# Patient Record
Sex: MALE | Race: WHITE
[De-identification: names, ages, dates, MRNs, and addresses within clinical notes are randomized per-mention and may not be internally consistent; named-entity substitution may affect disease eponyms.]

---

## 2017-03-10 ENCOUNTER — HOSPITAL ENCOUNTER (OUTPATIENT)
Dept: HOSPITAL 79 - NM | Age: 52
End: 2017-03-10
Attending: SURGERY
Payer: COMMERCIAL

## 2017-03-10 DIAGNOSIS — R10.11: Primary | ICD-10-CM

## 2017-03-10 PROCEDURE — A9537 TC99M MEBROFENIN: HCPCS

## 2017-03-13 ENCOUNTER — HOSPITAL ENCOUNTER (OUTPATIENT)
Dept: HOSPITAL 79 - KOH-I | Age: 52
End: 2017-03-13
Attending: SURGERY
Payer: COMMERCIAL

## 2017-03-13 DIAGNOSIS — R10.11: Primary | ICD-10-CM

## 2017-03-13 DIAGNOSIS — N20.0: ICD-10-CM

## 2017-05-28 ENCOUNTER — HOSPITAL ENCOUNTER (EMERGENCY)
Dept: HOSPITAL 79 - ER1 | Age: 52
Discharge: HOME | End: 2017-05-28
Payer: COMMERCIAL

## 2017-05-28 DIAGNOSIS — N13.2: Primary | ICD-10-CM

## 2017-05-28 DIAGNOSIS — E11.65: ICD-10-CM

## 2017-05-28 DIAGNOSIS — F17.220: ICD-10-CM

## 2017-05-28 DIAGNOSIS — I10: ICD-10-CM

## 2017-05-28 LAB
BUN/CREATININE RATIO: 19 (ref 0–10)
HGB BLD-MCNC: 14.2 GM/DL (ref 14–17.5)
RED BLOOD COUNT: 4.65 M/UL (ref 4.2–5.5)
WHITE BLOOD COUNT: 10.1 K/UL (ref 4.5–11)

## 2020-12-25 ENCOUNTER — HOSPITAL ENCOUNTER (INPATIENT)
Dept: HOSPITAL 79 - ER1 | Age: 55
LOS: 4 days | Discharge: HOME | DRG: 872 | End: 2020-12-29
Attending: INTERNAL MEDICINE | Admitting: INTERNAL MEDICINE
Payer: COMMERCIAL

## 2020-12-25 DIAGNOSIS — N40.0: ICD-10-CM

## 2020-12-25 DIAGNOSIS — Z20.828: ICD-10-CM

## 2020-12-25 DIAGNOSIS — F17.210: ICD-10-CM

## 2020-12-25 DIAGNOSIS — E11.65: ICD-10-CM
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
## 2020-12-25 DIAGNOSIS — Z95.820: ICD-10-CM

## 2020-12-25 DIAGNOSIS — G62.9: ICD-10-CM

## 2020-12-25 DIAGNOSIS — I10: ICD-10-CM

## 2020-12-25 DIAGNOSIS — Z82.49: ICD-10-CM

## 2020-12-25 DIAGNOSIS — I25.2: ICD-10-CM

## 2020-12-25 DIAGNOSIS — E78.5: ICD-10-CM

## 2020-12-25 DIAGNOSIS — A41.02: Primary | ICD-10-CM

## 2020-12-25 DIAGNOSIS — Z87.442: ICD-10-CM

## 2020-12-25 DIAGNOSIS — L03.113: ICD-10-CM

## 2020-12-25 DIAGNOSIS — B95.7: ICD-10-CM

## 2020-12-25 DIAGNOSIS — K21.9: ICD-10-CM

## 2020-12-25 DIAGNOSIS — Z83.3: ICD-10-CM

## 2020-12-25 LAB
BUN/CREATININE RATIO: 22 (ref 0–10)
HGB BLD-MCNC: 15.1 GM/DL (ref 14–17.5)
RED BLOOD COUNT: 4.89 M/UL (ref 4.2–5.5)
WHITE BLOOD COUNT: 12.1 K/UL (ref 4.5–11)

## 2020-12-25 PROCEDURE — G0378 HOSPITAL OBSERVATION PER HR: HCPCS

## 2020-12-25 PROCEDURE — U0003 INFECTIOUS AGENT DETECTION BY NUCLEIC ACID (DNA OR RNA); SEVERE ACUTE RESPIRATORY SYNDROME CORONAVIRUS 2 (SARS-COV-2) (CORONAVIRUS DISEASE [COVID-19]), AMPLIFIED PROBE TECHNIQUE, MAKING USE OF HIGH THROUGHPUT TECHNOLOGIES AS DESCRIBED BY CMS-2020-01-R: HCPCS

## 2020-12-26 LAB
BUN/CREATININE RATIO: 24 (ref 0–10)
HGB BLD-MCNC: 13.6 GM/DL (ref 14–17.5)
RED BLOOD COUNT: 4.5 M/UL (ref 4.2–5.5)
WHITE BLOOD COUNT: 10.6 K/UL (ref 4.5–11)

## 2020-12-27 LAB
BUN/CREATININE RATIO: 20 (ref 0–10)
HGB BLD-MCNC: 12.9 GM/DL (ref 14–17.5)
RED BLOOD COUNT: 4.25 M/UL (ref 4.2–5.5)
WHITE BLOOD COUNT: 8.4 K/UL (ref 4.5–11)

## 2020-12-27 NOTE — NUR
PT STATES HES FEELING SWEATY CHECKED HIUS SUGAR AND WAS 61 GOT HIM REGULAR
SODA AND CANDY BAR TO EAT , INSTRUCTED NOT TO GET OOB WITHOUT ASSISTANCE, PT
VERBALIZES

## 2020-12-28 LAB
BUN/CREATININE RATIO: 15 (ref 0–10)
HGB BLD-MCNC: 12.1 GM/DL (ref 14–17.5)
RED BLOOD COUNT: 4.13 M/UL (ref 4.2–5.5)
WHITE BLOOD COUNT: 8.3 K/UL (ref 4.5–11)

## 2020-12-29 LAB
BUN/CREATININE RATIO: 14 (ref 0–10)
HGB BLD-MCNC: 13.5 GM/DL (ref 14–17.5)
RED BLOOD COUNT: 4.46 M/UL (ref 4.2–5.5)
WHITE BLOOD COUNT: 8.2 K/UL (ref 4.5–11)

## 2020-12-30 ENCOUNTER — HOSPITAL ENCOUNTER (OUTPATIENT)
Dept: HOSPITAL 79 - OPSV | Age: 55
End: 2020-12-30
Attending: INTERNAL MEDICINE
Payer: COMMERCIAL

## 2020-12-30 DIAGNOSIS — L03.119: Primary | ICD-10-CM

## 2021-03-02 ENCOUNTER — HOSPITAL ENCOUNTER (OUTPATIENT)
Dept: HOSPITAL 79 - LAB | Age: 56
End: 2021-03-02
Attending: INTERNAL MEDICINE
Payer: COMMERCIAL

## 2021-03-02 DIAGNOSIS — E78.5: ICD-10-CM

## 2021-03-02 DIAGNOSIS — Z79.4: ICD-10-CM

## 2021-03-02 DIAGNOSIS — E53.8: ICD-10-CM

## 2021-03-02 DIAGNOSIS — E11.21: Primary | ICD-10-CM

## 2021-03-02 LAB — BUN/CREATININE RATIO: 17 (ref 0–10)

## 2021-03-03 LAB
CREATININE, URINE: 118.2 MG/DL
MICROALB/CREAT RATIO: 103 (ref 0–29)

## 2021-03-08 ENCOUNTER — HOSPITAL ENCOUNTER (OUTPATIENT)
Dept: HOSPITAL 79 - LBRF | Age: 56
End: 2021-03-08
Attending: UROLOGY
Payer: COMMERCIAL

## 2021-03-08 DIAGNOSIS — R31.9: Primary | ICD-10-CM

## 2021-07-27 ENCOUNTER — HOSPITAL ENCOUNTER (OUTPATIENT)
Dept: HOSPITAL 79 - LAB | Age: 56
End: 2021-07-27
Attending: FAMILY MEDICINE
Payer: COMMERCIAL

## 2021-07-27 DIAGNOSIS — Z12.5: Primary | ICD-10-CM

## 2021-07-27 DIAGNOSIS — N40.0: ICD-10-CM

## 2021-07-27 DIAGNOSIS — R79.89: ICD-10-CM

## 2021-07-27 DIAGNOSIS — E53.8: ICD-10-CM

## 2021-07-27 DIAGNOSIS — E78.2: ICD-10-CM

## 2021-07-27 DIAGNOSIS — E11.65: ICD-10-CM

## 2021-07-27 LAB
BUN/CREATININE RATIO: 17 (ref 0–10)
HGB BLD-MCNC: 14.8 GM/DL (ref 14–17.5)
RED BLOOD COUNT: 4.77 M/UL (ref 4.2–5.5)
WHITE BLOOD COUNT: 7.9 K/UL (ref 4.5–11)

## 2021-07-28 LAB
CREATININE, URINE: 55.7 MG/DL
MICROALB/CREAT RATIO: 182 (ref 0–29)

## 2021-08-10 ENCOUNTER — HOSPITAL ENCOUNTER (OUTPATIENT)
Dept: HOSPITAL 79 - KOH-I | Age: 56
End: 2021-08-10
Attending: FAMILY MEDICINE
Payer: COMMERCIAL

## 2021-08-10 DIAGNOSIS — F17.210: Primary | ICD-10-CM

## 2021-09-09 ENCOUNTER — TRANSCRIBE ORDERS (OUTPATIENT)
Dept: ADMINISTRATIVE | Facility: HOSPITAL | Age: 56
End: 2021-09-09

## 2021-09-09 DIAGNOSIS — Z11.52 ENCOUNTER FOR SCREENING FOR COVID-19: Primary | ICD-10-CM

## 2021-09-21 ENCOUNTER — HOSPITAL ENCOUNTER (OUTPATIENT)
Dept: HOSPITAL 79 - LAB | Age: 56
End: 2021-09-21
Attending: INTERNAL MEDICINE
Payer: COMMERCIAL

## 2021-09-21 DIAGNOSIS — E11.21: Primary | ICD-10-CM

## 2021-09-21 DIAGNOSIS — E78.5: ICD-10-CM

## 2021-09-21 DIAGNOSIS — Z79.4: ICD-10-CM

## 2021-09-21 LAB — BUN/CREATININE RATIO: 17 (ref 0–10)

## 2021-11-01 ENCOUNTER — HOSPITAL ENCOUNTER (OUTPATIENT)
Dept: HOSPITAL 79 - LAB | Age: 56
End: 2021-11-01
Attending: INTERNAL MEDICINE
Payer: COMMERCIAL

## 2021-11-01 DIAGNOSIS — Z79.4: ICD-10-CM

## 2021-11-01 DIAGNOSIS — E78.5: ICD-10-CM

## 2021-11-01 DIAGNOSIS — E11.21: Primary | ICD-10-CM

## 2021-11-01 LAB — BUN/CREATININE RATIO: 17 (ref 0–10)

## 2023-08-09 ENCOUNTER — TRANSCRIBE ORDERS (OUTPATIENT)
Dept: ADMINISTRATIVE | Facility: HOSPITAL | Age: 58
End: 2023-08-09
Payer: COMMERCIAL

## 2023-08-09 DIAGNOSIS — N28.1 ACQUIRED CYST OF KIDNEY: Primary | ICD-10-CM

## 2023-09-12 ENCOUNTER — HOSPITAL ENCOUNTER (OUTPATIENT)
Dept: ULTRASOUND IMAGING | Facility: HOSPITAL | Age: 58
Discharge: HOME | End: 2023-09-12
Payer: COMMERCIAL

## 2023-09-12 ENCOUNTER — LAB (OUTPATIENT)
Dept: LAB | Facility: HOSPITAL | Age: 58
End: 2023-09-12
Payer: COMMERCIAL

## 2023-09-12 ENCOUNTER — TRANSCRIBE ORDERS (OUTPATIENT)
Dept: ADMINISTRATIVE | Facility: HOSPITAL | Age: 58
End: 2023-09-12
Payer: COMMERCIAL

## 2023-09-12 DIAGNOSIS — N18.31 CHRONIC KIDNEY DISEASE, STAGE 3A: Primary | ICD-10-CM

## 2023-09-12 DIAGNOSIS — N28.1 ACQUIRED CYST OF KIDNEY: ICD-10-CM

## 2023-09-12 DIAGNOSIS — N18.31 CHRONIC KIDNEY DISEASE, STAGE 3A: ICD-10-CM

## 2023-09-12 PROCEDURE — 86803 HEPATITIS C AB TEST: CPT

## 2023-09-12 PROCEDURE — 80053 COMPREHEN METABOLIC PANEL: CPT

## 2023-09-12 PROCEDURE — 86038 ANTINUCLEAR ANTIBODIES: CPT

## 2023-09-12 PROCEDURE — 82043 UR ALBUMIN QUANTITATIVE: CPT

## 2023-09-12 PROCEDURE — 82570 ASSAY OF URINE CREATININE: CPT

## 2023-09-12 PROCEDURE — 86160 COMPLEMENT ANTIGEN: CPT

## 2023-09-12 PROCEDURE — 83516 IMMUNOASSAY NONANTIBODY: CPT

## 2023-09-12 PROCEDURE — 86037 ANCA TITER EACH ANTIBODY: CPT

## 2023-09-12 PROCEDURE — 86225 DNA ANTIBODY NATIVE: CPT

## 2023-09-12 PROCEDURE — 86162 COMPLEMENT TOTAL (CH50): CPT

## 2023-09-12 PROCEDURE — 86334 IMMUNOFIX E-PHORESIS SERUM: CPT

## 2023-09-12 PROCEDURE — 84165 PROTEIN E-PHORESIS SERUM: CPT

## 2023-09-12 PROCEDURE — 82784 ASSAY IGA/IGD/IGG/IGM EACH: CPT

## 2023-09-12 PROCEDURE — 76775 US EXAM ABDO BACK WALL LIM: CPT

## 2023-09-12 PROCEDURE — 36415 COLL VENOUS BLD VENIPUNCTURE: CPT

## 2023-09-12 PROCEDURE — 87340 HEPATITIS B SURFACE AG IA: CPT

## 2023-09-12 PROCEDURE — 84156 ASSAY OF PROTEIN URINE: CPT

## 2023-09-13 ENCOUNTER — LAB (OUTPATIENT)
Dept: LAB | Facility: HOSPITAL | Age: 58
End: 2023-09-13
Payer: COMMERCIAL

## 2023-09-13 DIAGNOSIS — N18.31 CHRONIC KIDNEY DISEASE, STAGE 3A: ICD-10-CM

## 2023-09-13 LAB
ALBUMIN SERPL ELPH-MCNC: 3 G/DL (ref 2.9–4.4)
ALBUMIN SERPL-MCNC: 3.9 G/DL (ref 3.5–5.2)
ALBUMIN UR-MCNC: 113.9 MG/DL
ALBUMIN/GLOB SERPL: 0.9 {RATIO} (ref 0.7–1.7)
ALBUMIN/GLOB SERPL: 1.1 G/DL
ALP SERPL-CCNC: 62 U/L (ref 39–117)
ALPHA1 GLOB SERPL ELPH-MCNC: 0.2 G/DL (ref 0–0.4)
ALPHA2 GLOB SERPL ELPH-MCNC: 1.1 G/DL (ref 0.4–1)
ALT SERPL W P-5'-P-CCNC: 12 U/L (ref 1–41)
ANA SER QL: NEGATIVE
ANION GAP SERPL CALCULATED.3IONS-SCNC: 12.2 MMOL/L (ref 5–15)
AST SERPL-CCNC: 16 U/L (ref 1–40)
B-GLOBULIN SERPL ELPH-MCNC: 1.1 G/DL (ref 0.7–1.3)
BILIRUB SERPL-MCNC: 0.5 MG/DL (ref 0–1.2)
BUN SERPL-MCNC: 16 MG/DL (ref 8–23)
BUN/CREAT SERPL: 13.9 (ref 7–25)
C3 SERPL-MCNC: 157 MG/DL (ref 82–167)
C4 SERPL-MCNC: 40 MG/DL (ref 14–44)
CALCIUM SPEC-SCNC: 9.9 MG/DL (ref 8.6–10.5)
CHLORIDE SERPL-SCNC: 98 MMOL/L (ref 98–107)
CO2 SERPL-SCNC: 26.8 MMOL/L (ref 22–29)
CREAT SERPL-MCNC: 1.15 MG/DL (ref 0.76–1.27)
CREAT UR-MCNC: 46.7 MG/DL
CREAT UR-MCNC: 46.7 MG/DL
DSDNA AB SER-ACNC: 1 IU/ML (ref 0–9)
EGFRCR SERPLBLD CKD-EPI 2021: 72.9 ML/MIN/1.73
GAMMA GLOB SERPL ELPH-MCNC: 1 G/DL (ref 0.4–1.8)
GLOBULIN SER-MCNC: 3.4 G/DL (ref 2.2–3.9)
GLOBULIN UR ELPH-MCNC: 3.4 GM/DL
GLUCOSE SERPL-MCNC: 314 MG/DL (ref 65–99)
HBV SURFACE AG SERPL QL IA: NORMAL
HCV AB SER DONR QL: NORMAL
IGA SERPL-MCNC: 261 MG/DL (ref 90–386)
IGG SERPL-MCNC: 1021 MG/DL (ref 603–1613)
IGM SERPL-MCNC: 121 MG/DL (ref 20–172)
INTERPRETATION SERPL IEP-IMP: ABNORMAL
LABORATORY COMMENT REPORT: ABNORMAL
M PROTEIN SERPL ELPH-MCNC: ABNORMAL G/DL
MICROALBUMIN/CREAT UR: 2439 MG/G
POTASSIUM SERPL-SCNC: 4.2 MMOL/L (ref 3.5–5.2)
PROT ?TM UR-MCNC: 163.9 MG/DL
PROT SERPL-MCNC: 6.4 G/DL (ref 6–8.5)
PROT SERPL-MCNC: 7.3 G/DL (ref 6–8.5)
PROT/CREAT UR: 3509.6 MG/G CREA (ref 0–200)
SODIUM SERPL-SCNC: 137 MMOL/L (ref 136–145)

## 2023-09-13 PROCEDURE — 81050 URINALYSIS VOLUME MEASURE: CPT

## 2023-09-13 PROCEDURE — 84156 ASSAY OF PROTEIN URINE: CPT

## 2023-09-14 LAB
C-ANCA TITR SER IF: NORMAL TITER
CH50 SERPL-ACNC: >60 U/ML
COLLECT DURATION TIME UR: 24 HRS
GBM AB SER IA-ACNC: <0.2 UNITS (ref 0–0.9)
MYELOPEROXIDASE AB SER IA-ACNC: <0.2 UNITS (ref 0–0.9)
P-ANCA ATYPICAL TITR SER IF: NORMAL TITER
P-ANCA TITR SER IF: NORMAL TITER
PROT 24H UR-MRATE: 3143.7 MG/24HOURS (ref 0–150)
PROTEINASE3 AB SER IA-ACNC: <0.2 UNITS (ref 0–0.9)
SPECIMEN VOL 24H UR: 2100 ML

## 2023-12-22 ENCOUNTER — SPECIALTY PHARMACY (OUTPATIENT)
Dept: PHARMACY | Facility: HOSPITAL | Age: 58
End: 2023-12-22
Payer: COMMERCIAL

## 2023-12-22 ENCOUNTER — OFFICE VISIT (OUTPATIENT)
Dept: ENDOCRINOLOGY | Facility: CLINIC | Age: 58
End: 2023-12-22
Payer: COMMERCIAL

## 2023-12-22 VITALS
BODY MASS INDEX: 30.21 KG/M2 | HEART RATE: 91 BPM | HEIGHT: 69 IN | WEIGHT: 204 LBS | SYSTOLIC BLOOD PRESSURE: 124 MMHG | DIASTOLIC BLOOD PRESSURE: 72 MMHG | OXYGEN SATURATION: 99 %

## 2023-12-22 DIAGNOSIS — E11.65 TYPE 2 DIABETES MELLITUS WITH HYPERGLYCEMIA, UNSPECIFIED WHETHER LONG TERM INSULIN USE: Primary | ICD-10-CM

## 2023-12-22 LAB — GLUCOSE BLDC GLUCOMTR-MCNC: 444 MG/DL (ref 70–130)

## 2023-12-22 RX ORDER — ASPIRIN 81 MG/1
81 TABLET ORAL DAILY
COMMUNITY

## 2023-12-22 RX ORDER — CILOSTAZOL 50 MG/1
50 TABLET ORAL 2 TIMES DAILY
COMMUNITY
Start: 2023-09-07

## 2023-12-22 RX ORDER — INSULIN LISPRO 100 [IU]/ML
50 INJECTION, SOLUTION INTRAVENOUS; SUBCUTANEOUS
COMMUNITY
Start: 2023-10-27 | End: 2023-12-22 | Stop reason: SDUPTHER

## 2023-12-22 RX ORDER — PROCHLORPERAZINE 25 MG/1
1 SUPPOSITORY RECTAL
Qty: 1 EACH | Refills: 3 | Status: SHIPPED | OUTPATIENT
Start: 2023-12-22

## 2023-12-22 RX ORDER — OMEPRAZOLE 20 MG/1
20 CAPSULE, DELAYED RELEASE ORAL DAILY
COMMUNITY
Start: 2023-09-13

## 2023-12-22 RX ORDER — PROCHLORPERAZINE 25 MG/1
1 SUPPOSITORY RECTAL CONTINUOUS
Qty: 1 EACH | Refills: 0 | Status: SHIPPED | OUTPATIENT
Start: 2023-12-22

## 2023-12-22 RX ORDER — ICOSAPENT ETHYL 1000 MG/1
2 CAPSULE ORAL 2 TIMES DAILY WITH MEALS
COMMUNITY
Start: 2023-04-24

## 2023-12-22 RX ORDER — FLUTICASONE PROPIONATE 50 MCG
2 SPRAY, SUSPENSION (ML) NASAL DAILY
COMMUNITY

## 2023-12-22 RX ORDER — NEOMYCIN SULFATE, POLYMYXIN B SULFATE AND HYDROCORTISONE 10; 3.5; 1 MG/ML; MG/ML; [USP'U]/ML
4 SUSPENSION/ DROPS AURICULAR (OTIC) 4 TIMES DAILY
COMMUNITY
Start: 2023-09-13 | End: 2023-12-22

## 2023-12-22 RX ORDER — BLOOD SUGAR DIAGNOSTIC
STRIP MISCELLANEOUS
COMMUNITY
Start: 2023-09-19

## 2023-12-22 RX ORDER — ATORVASTATIN CALCIUM 20 MG/1
20 TABLET, FILM COATED ORAL DAILY
COMMUNITY

## 2023-12-22 RX ORDER — ALFUZOSIN HYDROCHLORIDE 10 MG/1
10 TABLET, EXTENDED RELEASE ORAL DAILY
COMMUNITY
Start: 2023-08-09 | End: 2024-08-08

## 2023-12-22 RX ORDER — LANOLIN ALCOHOL/MO/W.PET/CERES
1000 CREAM (GRAM) TOPICAL DAILY
COMMUNITY

## 2023-12-22 RX ORDER — INSULIN LISPRO 100 [IU]/ML
50 INJECTION, SOLUTION INTRAVENOUS; SUBCUTANEOUS
Qty: 45 ML | Refills: 2 | Status: SHIPPED | OUTPATIENT
Start: 2023-12-22

## 2023-12-22 RX ORDER — PROCHLORPERAZINE 25 MG/1
SUPPOSITORY RECTAL
Qty: 3 EACH | Refills: 11 | Status: SHIPPED | OUTPATIENT
Start: 2023-12-22

## 2023-12-22 RX ORDER — SULFAMETHOXAZOLE AND TRIMETHOPRIM 800; 160 MG/1; MG/1
1 TABLET ORAL EVERY 12 HOURS SCHEDULED
COMMUNITY
Start: 2023-10-20 | End: 2023-12-22

## 2023-12-22 RX ORDER — INSULIN GLARGINE 100 [IU]/ML
INJECTION, SOLUTION SUBCUTANEOUS
COMMUNITY
Start: 2023-11-27 | End: 2023-12-22

## 2023-12-22 RX ORDER — GABAPENTIN 800 MG/1
TABLET ORAL
COMMUNITY
Start: 2023-07-28

## 2023-12-22 RX ORDER — LISINOPRIL 10 MG/1
10 TABLET ORAL DAILY
COMMUNITY
Start: 2023-09-13

## 2023-12-22 RX ORDER — PROMETHAZINE HYDROCHLORIDE 25 MG/1
25 TABLET ORAL AS NEEDED
COMMUNITY
Start: 2023-07-11

## 2023-12-22 NOTE — PROGRESS NOTES
Specialty Pharmacy Patient Management Program  Endocrinology Initial Assessment       Tyler Rick is a 58 y.o. male with Type 2 Diabetes seen by an Endocrinology provider and enrolled in the Endocrinology Patient Management program offered by [facility].  An initial outreach was conducted, including assessment of therapy appropriateness and specialty medication education for insulin therapy. The patient was introduced to services offered by [facility], including: regular assessments, refill coordination, curbside pick-up or mail order delivery options, prior authorization maintenance, and financial assistance programs as applicable. The patient was also provided with contact information for the pharmacy team.     Patient was diagnosed with diabetes in the early 2000s. He is currently taking Humalog 50 units three times daily with meals. Patient checks BG 3 times daily with readings between 200-500s. Patient denies low BG <70. He was prescribed Lantus in the past but has not taken in 2-3 months due to being out of refills.     Patient denies personal/family history of thyroid cancer, denies history of pancreatitis, and denies issues with recurrent UTI/yeast infections.     In the past, the patient has tried:     Drug Dose Reason for Discontinuation Notes   Metformin  GI intolerance                  Insurance Coverage & Financial Support  [place] Medicaid     Relevant Past Medical History and Comorbidities  Relevant medical history and concomitant health conditions were discussed with the patient. The patient's chart has been reviewed for relevant past medical history and comorbid health conditions and updated as necessary.   No past medical history on file.  Social History     Socioeconomic History    Marital status: Unknown   Tobacco Use    Smoking status: Every Day     Types: Cigarettes     Passive exposure: Current    Smokeless tobacco: Current     Types: Snuff     "Tobacco comments:     Pt stated he just smokes 2 cigarettes a day with his coffee.    Substance and Sexual Activity    Alcohol use: Never    Drug use: Never    Sexual activity: Defer       Problem list reviewed by Margoth Weinberg RPH on 12/22/2023 at  8:26 AM    Allergies  Known allergies and reactions were discussed with the patient. The patient's chart has been reviewed for  allergy information and updated as necessary.   No Known Allergies    Allergies reviewed by Margoth Weinberg RPH on 12/22/2023 at  8:23 AM    Relevant Laboratory Values    No results found for: \"HGBA1C\"  Lab Results   Component Value Date    GLUCOSE 314 (H) 09/12/2023    CALCIUM 9.9 09/12/2023     09/12/2023    K 4.2 09/12/2023    CO2 26.8 09/12/2023    CL 98 09/12/2023    BUN 16 09/12/2023    CREATININE 1.15 09/12/2023    BCR 13.9 09/12/2023    ANIONGAP 12.2 09/12/2023     No results found for: \"CHOL\", \"CHLPL\", \"TRIG\", \"HDL\", \"LDL\", \"LDLDIRECT\"  Microalbumin          9/12/2023    13:06   Microalbumin   Microalbumin, Urine 113.9        Current Medication List  This medication list has been reviewed with the patient and evaluated for any interactions or necessary modifications/recommendations, and updated to include all prescription medications, OTC medications, and supplements the patient is currently taking.  This list reflects what is contained in the patient's profile, which has also been marked as reviewed to communicate to other providers it is the most up to date version of the patient's current medication therapy.     Current Outpatient Medications:     alfuzosin (UROXATRAL) 10 MG 24 hr tablet, Take 1 tablet by mouth Daily., Disp: , Rfl:     aspirin 81 MG EC tablet, Take 1 tablet by mouth Daily., Disp: , Rfl:     atorvastatin (LIPITOR) 20 MG tablet, Take 1 tablet by mouth Daily., Disp: , Rfl:     cilostazol (PLETAL) 50 MG tablet, Take 1 tablet by mouth 2 (Two) Times a Day., Disp: , Rfl:     Diclofenac Sodium (VOLTAREN) 1 % gel gel, " Apply 2 g topically to the appropriate area as directed 2 (Two) Times a Day As Needed., Disp: , Rfl:     fluticasone (Flonase Allergy Relief) 50 MCG/ACT nasal spray, 2 sprays into the nostril(s) as directed by provider Daily., Disp: , Rfl:     gabapentin (NEURONTIN) 800 MG tablet, Take 1 tablet in morning, take 1 tablet mid day, and 2 tablets at bedtime., Disp: , Rfl:     icosapent ethyl (VASCEPA) 1 g capsule capsule, Take 2 g by mouth 2 (Two) Times a Day With Meals., Disp: , Rfl:     lisinopril (PRINIVIL,ZESTRIL) 10 MG tablet, Take 1 tablet by mouth Daily., Disp: , Rfl:     metoprolol tartrate (LOPRESSOR) 25 MG tablet, Take 1 tablet by mouth 2 (Two) Times a Day., Disp: , Rfl:     omeprazole (priLOSEC) 20 MG capsule, Take 1 capsule by mouth Daily., Disp: , Rfl:     OneTouch Verio test strip, CHECK BLOOD SUGAR THREE TIMES A DAY, Disp: , Rfl:     promethazine (PHENERGAN) 25 MG tablet, Take 1 tablet by mouth As Needed., Disp: , Rfl:     vitamin B-12 (CYANOCOBALAMIN) 1000 MCG tablet, Take 1 tablet by mouth Daily., Disp: , Rfl:     Continuous Blood Gluc  (Dexcom G6 ) device, Use 1 each Continuous., Disp: 1 each, Rfl: 0    Continuous Blood Gluc Sensor (Dexcom G6 Sensor), Use Every 10 (Ten) Days., Disp: 3 each, Rfl: 11    Continuous Blood Gluc Transmit (Dexcom G6 Transmitter) misc, Use 1 each Every 3 (Three) Months., Disp: 1 each, Rfl: 3    Insulin Glargine (LANTUS SOLOSTAR) 100 UNIT/ML injection pen, Inject 40 Units under the skin into the appropriate area as directed Every Night., Disp: 15 mL, Rfl: 2    Insulin Lispro, 1 Unit Dial, (HUMALOG) 100 UNIT/ML solution pen-injector, Inject 50 Units under the skin into the appropriate area as directed 3 (Three) Times a Day Before Meals., Disp: 45 mL, Rfl: 2    Insulin Pen Needle 32G X 6 MM misc, Use 1 each 4 (Four) Times a Day., Disp: 150 each, Rfl: 2    Medicines reviewed by Margoth Weinberg, [facility] on 12/22/2023 at  8:26 AM    Drug Interactions  No significant  drug-drug interactions with diabetes medications expected according to literature.  The hypoglycemic effect of Insulin may be increased or prolonged by metoprolol     Recommended Medications Assessment  Aspirin -  Currently Taking   Statin -  Currently Taking   ACEi/ARB - Currently Taking     Current 10-Year ASCVD Risk: pt has clinical ASCVD    Adherence and Self-Administration  Adherence related to the patient's specialty therapy was discussed with the patient. The Adherence segment of this outreach has been reviewed and updated.   Is there a concern with patient's ability to self administer the medication correctly and without issue?: No  Were any potential barriers to adherence identified during the initial assessment or patient education?: No  Are there any concerns regarding the patient's understanding of the importance of medication adherence?: No    Barriers to Patient Adherence and/or Self-Administration: None   Methods for Supporting Patient Adherence and/or Self-Administration: None required    Goals of Therapy  Goals related to the patient's specialty therapy were discussed with the patient. The Patient Goals segment of this outreach has been reviewed and updated.    Goals Addressed Today        Consistently take medications as prescribed      HEMOGLOBIN A1C < 7           Reassessment Plan & Follow-Up  Patient's diabetes is uncontrolled with most recent A1C of 9.3%.  Medication Therapy Changes:   Continue Humalog 50 units TID with meals   Start Lantus 40 units QHS  Start Dexcom G6. Provided education using demonstration device.    Related Plans, Therapy Recommendations or Therapy Problems to Be Addressed: Provider is ordering labs to test for T1DM vs. T2DM today.     Patient does not wish to use [facility] at this time due to: loyalty to retail pharmacy     Education Provided for DM medications:  The patient has been provided with the following education and any applicable administration  techniques (i.e. self-injection) have been demonstrated for the therapies indicated. All questions and concerns have been addressed prior to the patient receiving the medication, and the patient has verbalized understanding of the education and any materials provided.  Additional patient education shall be provided and documented upon request by the patient, provider or payer.      Dexcom G6 Patient Education  Educated patient on using Dexcom device to monitor BG:   Patient is using  to monitor blood sugars.    Sensors:  Educated on application process: clean area with alcohol beforehand. Place the sensor patch in the applicator and remove the plastic covering the adhesive. Break the small orange piece off the button. Place the applicator against the skin where you'd like to place the sensor and push the orange button to inject the needle into the skin. Discard the applicator. Slide the transmitter onto the sensor, narrow part first and click it into place.  Must be changed every 10 days. One month supply will include three sensors in one box.  Sensor is placed on the abdomen, back of upper arm, or the upper buttocks (ages 2-17 years). Sensor placement is important and you will want to change your insertion site with each sensor.    Using the same site too often might not allow the skin to heal, causing scarring or skin irritation.  Choose a site:  At least 3 inches from any insulin pump infusion set or injection site  Away from waistbands, scarring, tattoos, irritation, and bones  Unlikely to be bumped, pushed, or laid on while sleeping    Transmitter:  Do not discard the transmitter with each sensor change. The sensor is good for 90 days.  When switching sensor, remove the transmitter from the sensor patch, discard the sensor, reapply a new sensor and place transmitter into sensor.      [place] on Smart Phone:  Will be used to alert you when blood sugar is low or high and to obtain blood sugar readings.  High BG alerts are set at 250 and low BG alerts are set at 70.   Will take 2 hours to warm up with each new sensor.   Each time a new sensor is started, type in the unique code for the new sensor.  Each time a new transmitter is started, type in the unique code for the new transmitter.      Attestation  I attest the patient was actively involved in and has agreed to the above plan of care. If the prescribed therapy is at any point deemed not appropriate based on the current or future assessments, a consultation will be initiated with the patient's specialty care provider to determine the best course of action. The revised plan of therapy will be documented along with any required assessments and/or additional patient education provided..    Margoth Weinberg, PharmD, [facility]  Clinical Specialty Pharmacist, Endocrinology  12/22/2023  09:51 EST

## 2023-12-22 NOTE — ASSESSMENT & PLAN NOTE
-Diabetes is above goal with A1c 9.3.  -Discussed dietary and exercise guidelines with patient.  -Discussed the importance of yearly eye exams.  -Discussed the importance of checking BG's regularly.  He would like to start using a CGM.  Will send in RX to pharmacy.  -Continue Humalog 50 units TID.  -Start Lantus 40 units QHS.  -S/S hypoglycemia reviewed with Rule of 15's advised.  -Follow-up in 1 month.

## 2023-12-22 NOTE — PROGRESS NOTES
Chief Complaint   Patient presents with    Diabetes        Referring Provider  Bakari Montanez MD     HPI   Tyler Rick is a 58 y.o. male had concerns including Diabetes.    Seen as a new patient.  T2DM.    Diabetes was diagnosed early 2000's.  Complications include neuropathy, 3 heart attacks.  Last ophtho exam was 2023-[facility].  Current medications for diabetes include Humalog 50 units TID.  Previous meds: Metformin-stopped taking it, Lantus-has not had it in 3 months due to not having his medication  He checks his blood sugar 3 times per day.   Hypos: none    ACE/ARB: yes, Statin: yes  Labs:  A1C:9.3 (10/26/2023)  Lipid Panel:utd  ADARSH: utd    The following portions of the patient's history were reviewed and updated as appropriate: allergies, current medications, past family history, past medical history, past social history, past surgical history, and problem list.    Diet: he tries to limit his carbs and sugars, has never had diabetes education in the past.    History reviewed. No pertinent past medical history.  History reviewed. No pertinent surgical history.   History reviewed. No pertinent family history.   Social History     Socioeconomic History    Marital status: Unknown   Tobacco Use    Smoking status: Every Day     Types: Cigarettes     Passive exposure: Current    Smokeless tobacco: Current     Types: Snuff    Tobacco comments:     Pt stated he just smokes 2 cigarettes a day with his coffee.    Substance and Sexual Activity    Alcohol use: Never    Drug use: Never    Sexual activity: Defer      No Known Allergies   No current outpatient medications on file prior to visit.     No current facility-administered medications on file prior to visit.        Review of Systems   Constitutional:  Positive for fatigue and unexpected weight loss.   Eyes:  Positive for blurred vision.   Endocrine: Positive for polydipsia, polyphagia and polyuria.   Psychiatric/Behavioral:  Positive for sleep  "disturbance.    All other systems reviewed and are negative.    /72 (BP Location: Left arm, Patient Position: Sitting, Cuff Size: Adult)   Pulse 91   Ht 175.3 cm (69\")   Wt 92.5 kg (204 lb)   SpO2 99%   BMI 30.13 kg/m²      Physical Exam  Vitals reviewed.   Constitutional:       Appearance: Normal appearance.   Eyes:      Extraocular Movements: Extraocular movements intact.   Cardiovascular:      Rate and Rhythm: Tachycardia present.      Pulses:           Dorsalis pedis pulses are 2+ on the right side and 2+ on the left side.        Posterior tibial pulses are 2+ on the right side and 2+ on the left side.   Pulmonary:      Effort: Pulmonary effort is normal.   Feet:      Right foot:      Protective Sensation: 10 sites tested.  6 sites sensed.      Skin integrity: Callus and dry skin present.      Toenail Condition: Right toenails are abnormally thick and long. Fungal disease present.     Left foot:      Protective Sensation: 10 sites tested.  6 sites sensed.      Skin integrity: Callus and dry skin present.      Toenail Condition: Left toenails are abnormally thick and long.      Comments: Diabetic Foot Exam Performed and Monofilament Test Performed  Skin:     General: Skin is warm.   Neurological:      General: No focal deficit present.      Mental Status: He is alert and oriented to person, place, and time.   Psychiatric:         Mood and Affect: Mood normal.         Behavior: Behavior normal.         Thought Content: Thought content normal.         Judgment: Judgment normal.       CMP:  Lab Results   Component Value Date    BUN 16 09/12/2023    CREATININE 1.15 09/12/2023    BCR 13.9 09/12/2023     09/12/2023    K 4.2 09/12/2023    CO2 26.8 09/12/2023    CALCIUM 9.9 09/12/2023    PROTENTOTREF 6.4 09/12/2023    ALBUMIN 3.0 09/12/2023    ALBUMIN 3.9 09/12/2023    LABGLOBREF 3.4 09/12/2023    LABIL2 0.9 09/12/2023    BILITOT 0.5 09/12/2023    ALKPHOS 62 09/12/2023    AST 16 09/12/2023    ALT 12 " "09/12/2023     Lipid Panel:  No results found for: \"CHOL\", \"TRIG\", \"HDL\", \"VLDL\", \"LDL\"  HbA1c:     Glucose:  Lab Results   Component Value Date    POCGLU 444 (A) 12/22/2023     Microalbumin:  Lab Results   Component Value Date    VAN 2,439.0 09/12/2023     TSH:  No results found for: \"TSH\"    Assessment and Plan    Diagnoses and all orders for this visit:    1. Type 2 diabetes mellitus with hyperglycemia, unspecified whether long term insulin use (Primary)  Assessment & Plan:  -Diabetes is above goal with A1c 9.3.  -Discussed dietary and exercise guidelines with patient.  -Discussed the importance of yearly eye exams.  -Discussed the importance of checking BG's regularly.  He would like to start using a CGM.  Will send in RX to pharmacy.  -Continue Humalog 50 units TID.  -Start Lantus 40 units QHS.  -S/S hypoglycemia reviewed with Rule of 15's advised.  -Follow-up in 1 month.     Orders:  -     POC Glucose, Blood  -     Glutamic Acid Decarboxylase  -     Anti-islet Cell Antibody  -     IA-2 Autoantibodies  -     Ambulatory Referral to Podiatry    Other orders  -     Continuous Blood Gluc Sensor (Dexcom G6 Sensor); Use Every 10 (Ten) Days.  Dispense: 3 each; Refill: 11  -     Continuous Blood Gluc  (Dexcom G6 ) device; Use 1 each Continuous.  Dispense: 1 each; Refill: 0  -     Continuous Blood Gluc Transmit (Dexcom G6 Transmitter) misc; Use 1 each Every 3 (Three) Months.  Dispense: 1 each; Refill: 3  -     Insulin Pen Needle 32G X 6 MM misc; Use 1 each 4 (Four) Times a Day.  Dispense: 150 each; Refill: 2  -     Insulin Glargine (LANTUS SOLOSTAR) 100 UNIT/ML injection pen; Inject 40 Units under the skin into the appropriate area as directed Every Night.  Dispense: 15 mL; Refill: 2  -     Insulin Lispro, 1 Unit Dial, (HUMALOG) 100 UNIT/ML solution pen-injector; Inject 50 Units under the skin into the appropriate area as directed 3 (Three) Times a Day Before Meals.  Dispense: 45 mL; Refill: " 2         Return in about 4 weeks (around 1/19/2024) for Follow-up appointment, A1C. The patient was instructed to contact the clinic with any interval questions or concerns.        This document has been electronically signed by STU Shaikh  December 22, 2023 10:49 EST   Endocrinology    Please note that portions of this document were completed with a voice recognition program. Efforts were made to edit the dictations, but occasionally words are mis-transcribed.

## 2024-07-16 ENCOUNTER — SPECIALTY PHARMACY (OUTPATIENT)
Dept: PHARMACY | Facility: HOSPITAL | Age: 59
End: 2024-07-16
Payer: COMMERCIAL

## 2024-07-16 ENCOUNTER — OFFICE VISIT (OUTPATIENT)
Dept: ENDOCRINOLOGY | Facility: CLINIC | Age: 59
End: 2024-07-16
Payer: COMMERCIAL

## 2024-07-16 VITALS
HEIGHT: 69 IN | OXYGEN SATURATION: 98 % | BODY MASS INDEX: 31.04 KG/M2 | WEIGHT: 209.6 LBS | DIASTOLIC BLOOD PRESSURE: 84 MMHG | SYSTOLIC BLOOD PRESSURE: 133 MMHG | HEART RATE: 93 BPM

## 2024-07-16 DIAGNOSIS — E11.65 TYPE 2 DIABETES MELLITUS WITH HYPERGLYCEMIA, UNSPECIFIED WHETHER LONG TERM INSULIN USE: Primary | ICD-10-CM

## 2024-07-16 LAB
EXPIRATION DATE: ABNORMAL
GLUCOSE BLDC GLUCOMTR-MCNC: 444 MG/DL (ref 70–130)
HBA1C MFR BLD: 10.5 % (ref 4.5–5.7)
Lab: ABNORMAL

## 2024-07-16 PROCEDURE — 86341 ISLET CELL ANTIBODY: CPT | Performed by: NURSE PRACTITIONER

## 2024-07-16 RX ORDER — ACYCLOVIR 400 MG/1
1 TABLET ORAL
Qty: 3 EACH | Refills: 5 | Status: SHIPPED | OUTPATIENT
Start: 2024-07-16

## 2024-07-16 NOTE — ASSESSMENT & PLAN NOTE
-Diabetes is above goal with A1c 10.5.  -Discussed dietary and exercise guidelines with patient.  -Discussed the importance of yearly eye exams.  -Discussed the importance of checking BG's regularly.  Continue using CGM.  Placed new one in office to use.  -Increase Humalog 50 units TID.  -Increase Lantus 50 units QHS.  -Discussed taking medication regularly without missing doses.  -S/S hypoglycemia reviewed with Rule of 15's advised.  -Follow-up in 3 months.

## 2024-07-16 NOTE — PROGRESS NOTES
Chief Complaint   Patient presents with    Type 2 diabetes mellitus with hyperglycemia, unspecified wh        Referring Provider  Bakari Montanez MD     HPI   Tyler Rick is a 59 y.o. male had concerns including Type 2 diabetes mellitus with hyperglycemia, unspecified wh.    T2DM.    Patient was supposed to have antibody testing at his last visit and has not had that completed. He has not been able to use his CGM as it has not been working appropriately.    Diabetes:  Diabetes was diagnosed early 2000's.  Complications include neuropathy, 3 heart attacks.  Last ophtho exam was 2023-[facility].  Current medications for diabetes include Humalog 40 units TID, Lantus 40 units QHS.  Previous meds: Metformin-stopped taking it, Lantus-has not had it in 3 months due to not having his medication.  He checks his blood sugar with Dexcom CGM.   Hypos: none    ACE/ARB: yes, Statin: yes  Labs:  A1C:9.3 (10/26/2023)  Lipid Panel:utd  ADARSH: utd    The following portions of the patient's history were reviewed and updated as appropriate: allergies, current medications, past family history, past medical history, past social history, past surgical history, and problem list.    Diet: he tries to limit his carbs and sugars, has never had diabetes education in the past.    History reviewed. No pertinent past medical history.  History reviewed. No pertinent surgical history.   History reviewed. No pertinent family history.   Social History     Socioeconomic History    Marital status: Unknown   Tobacco Use    Smoking status: Every Day     Types: Cigarettes     Passive exposure: Current    Smokeless tobacco: Current     Types: Snuff    Tobacco comments:     Pt stated he just smokes 2 cigarettes a day with his coffee.    Substance and Sexual Activity    Alcohol use: Never    Drug use: Never    Sexual activity: Defer      No Known Allergies   Current Outpatient Medications on File Prior to Visit   Medication Sig Dispense Refill     alfuzosin (UROXATRAL) 10 MG 24 hr tablet Take 1 tablet by mouth Daily. (Patient not taking: Reported on 7/16/2024)      aspirin 81 MG EC tablet Take 1 tablet by mouth Daily.      atorvastatin (LIPITOR) 20 MG tablet Take 1 tablet by mouth Daily.      Continuous Blood Gluc  (Dexcom G6 ) device Use 1 each Continuous. 1 each 0    Continuous Blood Gluc Sensor (Dexcom G6 Sensor) Use Every 10 (Ten) Days. 3 each 11    Continuous Blood Gluc Transmit (Dexcom G6 Transmitter) misc Use 1 each Every 3 (Three) Months. 1 each 3    fluticasone (Flonase Allergy Relief) 50 MCG/ACT nasal spray 2 sprays into the nostril(s) as directed by provider Daily.      gabapentin (NEURONTIN) 800 MG tablet Take 1 tablet in morning, take 1 tablet mid day, and 2 tablets at bedtime.      icosapent ethyl (VASCEPA) 1 g capsule capsule Take 2 g by mouth 2 (Two) Times a Day With Meals.      Insulin Glargine (LANTUS SOLOSTAR) 100 UNIT/ML injection pen Inject 40 Units under the skin into the appropriate area as directed Every Night. 15 mL 2    Insulin Lispro, 1 Unit Dial, (HUMALOG) 100 UNIT/ML solution pen-injector Inject 50 Units under the skin into the appropriate area as directed 3 (Three) Times a Day Before Meals. (Patient taking differently: Inject 40 Units under the skin into the appropriate area as directed 3 (Three) Times a Day Before Meals.) 45 mL 2    Insulin Pen Needle 32G X 6 MM misc Use 1 each 4 (Four) Times a Day. 150 each 2    lisinopril (PRINIVIL,ZESTRIL) 10 MG tablet Take 1 tablet by mouth Daily.      metoprolol tartrate (LOPRESSOR) 25 MG tablet Take 1 tablet by mouth 2 (Two) Times a Day. (Patient not taking: Reported on 7/16/2024)      omeprazole (priLOSEC) 20 MG capsule Take 1 capsule by mouth Daily.      OneTouch Verio test strip CHECK BLOOD SUGAR THREE TIMES A DAY      promethazine (PHENERGAN) 25 MG tablet Take 1 tablet by mouth As Needed.      vitamin B-12 (CYANOCOBALAMIN) 1000 MCG tablet Take 1 tablet by mouth Daily.    "   [DISCONTINUED] cilostazol (PLETAL) 50 MG tablet Take 1 tablet by mouth 2 (Two) Times a Day.      [DISCONTINUED] Diclofenac Sodium (VOLTAREN) 1 % gel gel Apply 2 g topically to the appropriate area as directed 2 (Two) Times a Day As Needed.       No current facility-administered medications on file prior to visit.        Review of Systems   Constitutional:  Positive for fatigue and unexpected weight loss.   Eyes:  Positive for blurred vision.   Endocrine: Positive for polydipsia, polyphagia and polyuria.   Psychiatric/Behavioral:  Positive for sleep disturbance.    All other systems reviewed and are negative.    /84 (BP Location: Right arm, Patient Position: Sitting, Cuff Size: Adult)   Pulse 93   Ht 175.3 cm (69\")   Wt 95.1 kg (209 lb 9.6 oz)   SpO2 98%   BMI 30.95 kg/m²      Physical Exam  Vitals reviewed.   Constitutional:       Appearance: Normal appearance.   Eyes:      Extraocular Movements: Extraocular movements intact.   Cardiovascular:      Rate and Rhythm: Tachycardia present.   Pulmonary:      Effort: Pulmonary effort is normal.   Skin:     General: Skin is warm.   Neurological:      General: No focal deficit present.      Mental Status: He is alert and oriented to person, place, and time.   Psychiatric:         Mood and Affect: Mood normal.         Behavior: Behavior normal.         Thought Content: Thought content normal.         Judgment: Judgment normal.       CMP:  Lab Results   Component Value Date    BUN 16 09/12/2023    CREATININE 1.15 09/12/2023    BCR 13.9 09/12/2023     09/12/2023    K 4.2 09/12/2023    CO2 26.8 09/12/2023    CALCIUM 9.9 09/12/2023    PROTENTOTREF 6.4 09/12/2023    ALBUMIN 3.0 09/12/2023    ALBUMIN 3.9 09/12/2023    LABGLOBREF 3.4 09/12/2023    LABIL2 0.9 09/12/2023    BILITOT 0.5 09/12/2023    ALKPHOS 62 09/12/2023    AST 16 09/12/2023    ALT 12 09/12/2023     Lipid Panel:  No results found for: \"CHOL\", \"TRIG\", \"HDL\", \"VLDL\", \"LDL\"  HbA1c:  Lab Results " "  Component Value Date    HGBA1C 10.5 (A) 07/16/2024     Glucose:  Lab Results   Component Value Date    POCGLU 444 (A) 07/16/2024     Microalbumin:  Lab Results   Component Value Date    MALBCRERATIO 2,439.0 09/12/2023     TSH:  No results found for: \"TSH\"    Assessment and Plan    Diagnoses and all orders for this visit:    1. Type 2 diabetes mellitus with hyperglycemia, unspecified whether long term insulin use (Primary)  Assessment & Plan:  -Diabetes is above goal with A1c 10.5.  -Discussed dietary and exercise guidelines with patient.  -Discussed the importance of yearly eye exams.  -Discussed the importance of checking BG's regularly.  Continue using CGM.  Placed new one in office to use.  -Increase Humalog 50 units TID.  -Increase Lantus 50 units QHS.  -Discussed taking medication regularly without missing doses.  -S/S hypoglycemia reviewed with Rule of 15's advised.  -Follow-up in 3 months.     Orders:  -     POC Glucose, Blood  -     POC Glycosylated Hemoglobin (Hb A1C)         Return in about 3 months (around 10/16/2024) for Follow-up appointment, A1C. The patient was instructed to contact the clinic with any interval questions or concerns.        This document has been electronically signed by STU Shaikh  July 16, 2024 16:14 EDT   Endocrinology    Please note that portions of this document were completed with a voice recognition program. Efforts were made to edit the dictations, but occasionally words are mis-transcribed.   "

## 2024-07-16 NOTE — PROGRESS NOTES
Specialty Pharmacy Patient Management Program  Endocrinology Initial Assessment       Tyler Rick is a 59 y.o. male with Type 2 Diabetes seen by an Endocrinology provider and enrolled in the Endocrinology Patient Management program offered by [facility].  An initial outreach was conducted, including assessment of therapy appropriateness and specialty medication education for insulin therapy. The patient was introduced to services offered by [facility], including: regular assessments, refill coordination, curbside pick-up or mail order delivery options, prior authorization maintenance, and financial assistance programs as applicable. The patient was also provided with contact information for the pharmacy team.     He is currently taking Humalog 40 units three times daily with meals and Lantus 40 units daily. Patient uses Dexcom G6 to monitor BG but his sensor and transmitter was not working. He reports his BG is 'high and low' and if it is low then typically happens during the night. Patient denies low BG <70.     Patient denies personal/family history of thyroid cancer, denies history of pancreatitis, and denies issues with recurrent UTI/yeast infections.     In the past, the patient has tried:     Drug Dose Reason for Discontinuation Notes   Metformin  GI intolerance                  Insurance Coverage & Financial Support  [place] Medicaid     Relevant Past Medical History and Comorbidities  Relevant medical history and concomitant health conditions were discussed with the patient. The patient's chart has been reviewed for relevant past medical history and comorbid health conditions and updated as necessary.   No past medical history on file.  Social History     Socioeconomic History    Marital status: Unknown   Tobacco Use    Smoking status: Every Day     Types: Cigarettes     Passive exposure: Current    Smokeless tobacco: Current     Types: Snuff    Tobacco comments:  "    Pt stated he just smokes 2 cigarettes a day with his coffee.    Substance and Sexual Activity    Alcohol use: Never    Drug use: Never    Sexual activity: Defer       Problem list reviewed by Margoth Weinberg PharmD on 7/16/2024 at  4:35 PM    Allergies  Known allergies and reactions were discussed with the patient. The patient's chart has been reviewed for  allergy information and updated as necessary.   No Known Allergies    Allergies reviewed by Margoth Weinberg PharmD on 7/16/2024 at  4:35 PM    Relevant Laboratory Values  A1C Last 3 Results          7/16/2024    15:47   HGBA1C Last 3 Results   Hemoglobin A1C 10.5      Lab Results   Component Value Date    HGBA1C 10.5 (A) 07/16/2024     Lab Results   Component Value Date    GLUCOSE 314 (H) 09/12/2023    CALCIUM 9.9 09/12/2023     09/12/2023    K 4.2 09/12/2023    CO2 26.8 09/12/2023    CL 98 09/12/2023    BUN 16 09/12/2023    CREATININE 1.15 09/12/2023    BCR 13.9 09/12/2023    ANIONGAP 12.2 09/12/2023     No results found for: \"CHOL\", \"CHLPL\", \"TRIG\", \"HDL\", \"LDL\", \"LDLDIRECT\"  Microalbumin          9/12/2023    13:06   Microalbumin   Microalbumin, Urine 113.9        Current Medication List  This medication list has been reviewed with the patient and evaluated for any interactions or necessary modifications/recommendations, and updated to include all prescription medications, OTC medications, and supplements the patient is currently taking.  This list reflects what is contained in the patient's profile, which has also been marked as reviewed to communicate to other providers it is the most up to date version of the patient's current medication therapy.     Current Outpatient Medications:     aspirin 81 MG EC tablet, Take 1 tablet by mouth Daily., Disp: , Rfl:     atorvastatin (LIPITOR) 20 MG tablet, Take 1 tablet by mouth Daily., Disp: , Rfl:     Continuous Blood Gluc  (Dexcom G6 ) device, Use 1 each Continuous., Disp: 1 each, Rfl: 0    " Continuous Blood Gluc Transmit (Dexcom G6 Transmitter) misc, Use 1 each Every 3 (Three) Months., Disp: 1 each, Rfl: 3    fluticasone (Flonase Allergy Relief) 50 MCG/ACT nasal spray, 2 sprays into the nostril(s) as directed by provider Daily., Disp: , Rfl:     gabapentin (NEURONTIN) 800 MG tablet, Take 1 tablet in morning, take 1 tablet mid day, and 2 tablets at bedtime., Disp: , Rfl:     icosapent ethyl (VASCEPA) 1 g capsule capsule, Take 2 g by mouth 2 (Two) Times a Day With Meals., Disp: , Rfl:     Insulin Glargine (LANTUS SOLOSTAR) 100 UNIT/ML injection pen, Inject 40 Units under the skin into the appropriate area as directed Every Night., Disp: 15 mL, Rfl: 2    Insulin Lispro, 1 Unit Dial, (HUMALOG) 100 UNIT/ML solution pen-injector, Inject 50 Units under the skin into the appropriate area as directed 3 (Three) Times a Day Before Meals. (Patient taking differently: Inject 40 Units under the skin into the appropriate area as directed 3 (Three) Times a Day Before Meals.), Disp: 45 mL, Rfl: 2    Insulin Pen Needle 32G X 6 MM misc, Use 1 each 4 (Four) Times a Day., Disp: 150 each, Rfl: 2    lisinopril (PRINIVIL,ZESTRIL) 10 MG tablet, Take 1 tablet by mouth Daily., Disp: , Rfl:     omeprazole (priLOSEC) 20 MG capsule, Take 1 capsule by mouth Daily., Disp: , Rfl:     OneTouch Verio test strip, CHECK BLOOD SUGAR THREE TIMES A DAY, Disp: , Rfl:     promethazine (PHENERGAN) 25 MG tablet, Take 1 tablet by mouth As Needed., Disp: , Rfl:     vitamin B-12 (CYANOCOBALAMIN) 1000 MCG tablet, Take 1 tablet by mouth Daily., Disp: , Rfl:     alfuzosin (UROXATRAL) 10 MG 24 hr tablet, Take 1 tablet by mouth Daily. (Patient not taking: Reported on 7/16/2024), Disp: , Rfl:     Continuous Glucose Sensor (Dexcom G7 Sensor) misc, Use 1 each Every 10 (Ten) Days., Disp: 3 each, Rfl: 5    metoprolol tartrate (LOPRESSOR) 25 MG tablet, Take 1 tablet by mouth 2 (Two) Times a Day. (Patient not taking: Reported on 7/16/2024), Disp: , Rfl:      Medicines reviewed by Margoth Weinberg, PharmD on 7/16/2024 at  4:35 PM    Drug Interactions  No significant drug-drug interactions with diabetes medications expected according to literature.  The hypoglycemic effect of Insulin may be increased or prolonged by metoprolol     Recommended Medications Assessment  Aspirin -  Currently Taking   Statin -  Currently Taking   ACEi/ARB - Currently Taking     Current 10-Year ASCVD Risk: pt has clinical ASCVD    Adherence and Self-Administration  Adherence related to the patient's specialty therapy was discussed with the patient. The Adherence segment of this outreach has been reviewed and updated.            Barriers to Patient Adherence and/or Self-Administration: None   Methods for Supporting Patient Adherence and/or Self-Administration: None required    Goals of Therapy  Goals related to the patient's specialty therapy were discussed with the patient. The Patient Goals segment of this outreach has been reviewed and updated.    Goals Addressed Today        HEMOGLOBIN A1C < 7                   Reassessment Plan & Follow-Up  Patient's diabetes is uncontrolled with most recent A1C of 10.5%.  Medication Therapy Changes: None discussed with patient   Related Plans, Therapy Recommendations or Therapy Problems to Be Addressed:  Labs were ordered to test patient for T1DM by provider at last visit and he did not get the labs. Those labs will be drawn at this visit to further guide treatment decisions.   Helped set patient up with Dexcom G7 sample to help transition him over to G7.    Education Provided for DM medications:  The patient has been provided with the following education and any applicable administration techniques (i.e. self-injection) have been demonstrated for the therapies indicated. All questions and concerns have been addressed prior to the patient receiving the medication, and the patient has verbalized understanding of the education and any materials provided.   "Additional patient education shall be provided and documented upon request by the patient, provider or payer.      Dexcom G7 Patient Education  Educated patient on using Dexcom G7 device to monitor BG:   Patient is using  to monitor blood sugars.     Sensors:  Educated on application process: clean area with alcohol beforehand. Unscrew the lid on the applicator to expose the sensor. Press the applicator against the skin on the back of the upper arm and press the button to apply the sensor.   Must be changed every 10 days. One month supply will include three sensors.  Sensor is placed on the back of upper arm. Sensor placement is important and you will want to change your insertion site with each sensor.    Using the same site too often might not allow the skin to heal, causing scarring or skin irritation.  Choose a site:  At least 3 inches from any insulin pump infusion set or injection site  Away from waistbands, scarring, tattoos, irritation, and bones  Unlikely to be bumped, pushed, or laid on while sleeping  Once the sensor is placed, press \"start new sensor\" in satya on smart phone or on .   Each new sensor will take 1 hour to warm up and begin providing blood sugar readings.      /Satya on Smart Phone:  Will be used to alert you when blood sugar is low or high and to obtain blood sugar readings.          Patient does not wish to use [facility] at this time due to: loyalty to retail pharmacy     Attestation  I attest the patient was actively involved in and has agreed to the above plan of care. If the prescribed therapy is at any point deemed not appropriate based on the current or future assessments, a consultation will be initiated with the patient's specialty care provider to determine the best course of action. The revised plan of therapy will be documented along with any required assessments and/or additional patient education provided..    Elina Arambula, Phamacy Intern "   Margoth Weinberg, PharmD, LISA VASQUEZ  Clinical Specialty Pharmacist, Endocrinology  7/17/2024  10:46 EDT

## 2024-07-22 LAB — GAD65 AB SER IA-ACNC: <5 U/ML (ref 0–5)

## 2024-07-23 LAB — PANC ISLET CELL AB TITR SER: NEGATIVE {TITER}

## 2024-07-26 LAB — ISLET CELL512 AB SER-ACNC: <7.5 U/ML

## 2024-08-08 RX ORDER — INSULIN LISPRO 100 [IU]/ML
50 INJECTION, SOLUTION INTRAVENOUS; SUBCUTANEOUS
Qty: 45 ML | Refills: 2 | Status: SHIPPED | OUTPATIENT
Start: 2024-08-08

## 2024-11-18 RX ORDER — PEN NEEDLE, DIABETIC 32 GX 1/4"
NEEDLE, DISPOSABLE MISCELLANEOUS 4 TIMES DAILY
Qty: 100 EACH | Refills: 2 | Status: SHIPPED | OUTPATIENT
Start: 2024-11-18

## 2025-03-04 RX ORDER — ACYCLOVIR 400 MG/1
1 TABLET ORAL
Qty: 3 EACH | Refills: 5 | Status: SHIPPED | OUTPATIENT
Start: 2025-03-04

## 2025-03-24 ENCOUNTER — TRANSCRIBE ORDERS (OUTPATIENT)
Dept: ADMINISTRATIVE | Facility: HOSPITAL | Age: 60
End: 2025-03-24
Payer: COMMERCIAL

## 2025-03-24 ENCOUNTER — LAB (OUTPATIENT)
Dept: LAB | Facility: HOSPITAL | Age: 60
End: 2025-03-24
Payer: COMMERCIAL

## 2025-03-24 DIAGNOSIS — N18.31 STAGE 3A CHRONIC KIDNEY DISEASE: Primary | ICD-10-CM

## 2025-03-24 DIAGNOSIS — N39.0 URINARY TRACT INFECTION WITHOUT HEMATURIA, SITE UNSPECIFIED: ICD-10-CM

## 2025-03-24 DIAGNOSIS — I10 ESSENTIAL HYPERTENSION, MALIGNANT: ICD-10-CM

## 2025-03-24 DIAGNOSIS — N18.31 STAGE 3A CHRONIC KIDNEY DISEASE: ICD-10-CM

## 2025-03-24 LAB
ALBUMIN SERPL-MCNC: 3 G/DL (ref 3.5–5.2)
ALBUMIN/GLOB SERPL: 1 G/DL
ALP SERPL-CCNC: 87 U/L (ref 39–117)
ALT SERPL W P-5'-P-CCNC: 10 U/L (ref 1–41)
ANION GAP SERPL CALCULATED.3IONS-SCNC: 9.9 MMOL/L (ref 5–15)
AST SERPL-CCNC: 15 U/L (ref 1–40)
BILIRUB SERPL-MCNC: 0.2 MG/DL (ref 0–1.2)
BUN SERPL-MCNC: 16 MG/DL (ref 8–23)
BUN/CREAT SERPL: 10.3 (ref 7–25)
CALCIUM SPEC-SCNC: 9.2 MG/DL (ref 8.6–10.5)
CHLORIDE SERPL-SCNC: 100 MMOL/L (ref 98–107)
CO2 SERPL-SCNC: 28.1 MMOL/L (ref 22–29)
CREAT SERPL-MCNC: 1.55 MG/DL (ref 0.76–1.27)
EGFRCR SERPLBLD CKD-EPI 2021: 50.3 ML/MIN/1.73
GLOBULIN UR ELPH-MCNC: 3.1 GM/DL
GLUCOSE SERPL-MCNC: 310 MG/DL (ref 65–99)
HAV IGM SERPL QL IA: NORMAL
HBV CORE IGM SERPL QL IA: NORMAL
HBV SURFACE AG SERPL QL IA: NORMAL
HCV AB SER QL: NORMAL
POTASSIUM SERPL-SCNC: 4.1 MMOL/L (ref 3.5–5.2)
PROT SERPL-MCNC: 6.1 G/DL (ref 6–8.5)
SODIUM SERPL-SCNC: 138 MMOL/L (ref 136–145)

## 2025-03-24 PROCEDURE — 36415 COLL VENOUS BLD VENIPUNCTURE: CPT

## 2025-03-24 PROCEDURE — 81001 URINALYSIS AUTO W/SCOPE: CPT

## 2025-03-24 PROCEDURE — 82570 ASSAY OF URINE CREATININE: CPT

## 2025-03-24 PROCEDURE — 80053 COMPREHEN METABOLIC PANEL: CPT

## 2025-03-24 PROCEDURE — 84156 ASSAY OF PROTEIN URINE: CPT

## 2025-03-24 PROCEDURE — 80074 ACUTE HEPATITIS PANEL: CPT

## 2025-03-25 ENCOUNTER — TRANSCRIBE ORDERS (OUTPATIENT)
Dept: ADMINISTRATIVE | Facility: HOSPITAL | Age: 60
End: 2025-03-25
Payer: COMMERCIAL

## 2025-03-25 DIAGNOSIS — N28.1 SIMPLE RENAL CYST: Primary | ICD-10-CM

## 2025-03-25 LAB
BACTERIA UR QL AUTO: ABNORMAL /HPF
BILIRUB UR QL STRIP: NEGATIVE
CLARITY UR: CLEAR
COLOR UR: YELLOW
CREAT UR-MCNC: 44.3 MG/DL
GLUCOSE UR STRIP-MCNC: ABNORMAL MG/DL
HGB UR QL STRIP.AUTO: ABNORMAL
HOLD SPECIMEN: NORMAL
HYALINE CASTS UR QL AUTO: ABNORMAL /LPF
KETONES UR QL STRIP: NEGATIVE
LEUKOCYTE ESTERASE UR QL STRIP.AUTO: NEGATIVE
NITRITE UR QL STRIP: NEGATIVE
PH UR STRIP.AUTO: 6.5 [PH] (ref 5–8)
PROT ?TM UR-MCNC: 481.6 MG/DL
PROT UR QL STRIP: ABNORMAL
PROT/CREAT UR: ABNORMAL MG/G CREA (ref 0–200)
RBC # UR STRIP: ABNORMAL /HPF
REF LAB TEST METHOD: ABNORMAL
SP GR UR STRIP: 1.02 (ref 1–1.03)
SQUAMOUS #/AREA URNS HPF: ABNORMAL /HPF
UROBILINOGEN UR QL STRIP: ABNORMAL
WBC # UR STRIP: ABNORMAL /HPF

## 2025-04-10 ENCOUNTER — OFFICE VISIT (OUTPATIENT)
Dept: ENDOCRINOLOGY | Facility: CLINIC | Age: 60
End: 2025-04-10
Payer: COMMERCIAL

## 2025-04-10 VITALS
BODY MASS INDEX: 31.78 KG/M2 | HEART RATE: 100 BPM | WEIGHT: 215.2 LBS | DIASTOLIC BLOOD PRESSURE: 113 MMHG | SYSTOLIC BLOOD PRESSURE: 188 MMHG | OXYGEN SATURATION: 98 %

## 2025-04-10 DIAGNOSIS — E11.65 TYPE 2 DIABETES MELLITUS WITH HYPERGLYCEMIA, WITH LONG-TERM CURRENT USE OF INSULIN: Primary | ICD-10-CM

## 2025-04-10 DIAGNOSIS — Z79.4 TYPE 2 DIABETES MELLITUS WITH HYPERGLYCEMIA, WITH LONG-TERM CURRENT USE OF INSULIN: Primary | ICD-10-CM

## 2025-04-10 LAB — GLUCOSE P FAST SERPL-MCNC: 263 MG/DL (ref 74–106)

## 2025-04-10 PROCEDURE — 86337 INSULIN ANTIBODIES: CPT | Performed by: NURSE PRACTITIONER

## 2025-04-10 PROCEDURE — 86341 ISLET CELL ANTIBODY: CPT | Performed by: NURSE PRACTITIONER

## 2025-04-10 PROCEDURE — 84681 ASSAY OF C-PEPTIDE: CPT | Performed by: NURSE PRACTITIONER

## 2025-04-10 PROCEDURE — 82947 ASSAY GLUCOSE BLOOD QUANT: CPT | Performed by: NURSE PRACTITIONER

## 2025-04-10 RX ORDER — ACYCLOVIR 400 MG/1
1 TABLET ORAL
Qty: 9 EACH | Refills: 1 | Status: SHIPPED | OUTPATIENT
Start: 2025-04-10

## 2025-04-10 RX ORDER — AMLODIPINE BESYLATE 5 MG/1
5 TABLET ORAL DAILY
COMMUNITY
Start: 2025-03-07

## 2025-04-10 RX ORDER — PEN NEEDLE, DIABETIC 32 GX 1/4"
1 NEEDLE, DISPOSABLE MISCELLANEOUS 4 TIMES DAILY
Qty: 200 EACH | Refills: 2 | Status: SHIPPED | OUTPATIENT
Start: 2025-04-10

## 2025-04-10 RX ORDER — GLUCAGON 3 MG/1
3 POWDER NASAL AS NEEDED
Qty: 1 EACH | Refills: 2 | Status: SHIPPED | OUTPATIENT
Start: 2025-04-10

## 2025-04-10 RX ORDER — ACYCLOVIR 400 MG/1
TABLET ORAL
COMMUNITY
End: 2025-04-10 | Stop reason: SDUPTHER

## 2025-04-10 RX ORDER — CILOSTAZOL 50 MG/1
50 TABLET ORAL
COMMUNITY
Start: 2025-03-07

## 2025-04-10 RX ORDER — INSULIN LISPRO 100 [IU]/ML
50 INJECTION, SOLUTION INTRAVENOUS; SUBCUTANEOUS
Qty: 45 ML | Refills: 2 | Status: SHIPPED | OUTPATIENT
Start: 2025-04-10

## 2025-04-10 NOTE — ASSESSMENT & PLAN NOTE
-Diabetes is above goal with A1c 10.5.  -Discussed dietary and exercise guidelines with patient.  -Discussed the importance of yearly eye exams.  -Discussed the importance of checking BG's regularly.  Continue using CGM.  2 week data download is as follows:  Very High: 77%  High:15%  In Range:8%  Low: 0%  Very Low:0%  Trend shows overall hypers with varying hypers and hypos as well.  -Continue Humalog 50 units TID.  -Continue Lantus 50 units QHS.  -Discussed insulin pumps in depth.  Will obtain labs today to determine if patient is able to get a beta insulin pump.  IF his labs will allow, will start on this.  If labs will not allow for Beta pump, will start on Omnipod.  -Discussed use of Glucagon and appropriate time of use.   -Discussed taking medication regularly without missing doses.  -S/S hypoglycemia reviewed with Rule of 15's advised.  -Follow-up in 3 months.

## 2025-04-10 NOTE — PROGRESS NOTES
Chief Complaint   Patient presents with    Diabetes     F/u last A1c 02/10/25 value 9.7        Referring Provider  No ref. provider found     HPI   Tyler Rick is a 60 y.o. male had concerns including Diabetes (F/u last A1c 02/10/25 value 9.7).    T2DM.    He reports that he has been doing well.  He is continuing to take his BG's regularly.  He is noting significant fluctuations in his BG's and has been noting some hypos more often.      Diabetes:  Diabetes was diagnosed early 2000's.  Complications include neuropathy, 3 heart attacks.  Last ophtho exam was 2023-[facility].  Current medications for diabetes include Humalog 50 units TID, Lantus 50 units QHS.  Previous meds: Metformin-stopped taking it, Lantus-has not had it in 3 months due to not having his medication.  He checks his blood sugar with Dexcom CGM.   Hypos: none    ACE/ARB: yes, Statin: yes  Labs:  A1C:9.3 (10/26/2023)  Lipid Panel:utd  ADARSH: utd    The following portions of the patient's history were reviewed and updated as appropriate: allergies, current medications, past family history, past medical history, past social history, past surgical history, and problem list.    Diet: he tries to limit his carbs and sugars, has never had diabetes education in the past.    Past Medical History:   Diagnosis Date    Hypertension     Type 2 diabetes mellitus      History reviewed. No pertinent surgical history.   Family History   Family history unknown: Yes      Social History     Socioeconomic History    Marital status: Unknown   Tobacco Use    Smoking status: Some Days     Types: Cigarettes     Passive exposure: Current    Smokeless tobacco: Current     Types: Snuff    Tobacco comments:     Pt stated he just smokes 2 cigarettes a day with his coffee.    Vaping Use    Vaping status: Never Used   Substance and Sexual Activity    Alcohol use: Never    Drug use: Never    Sexual activity: Defer      No Known Allergies   Current Outpatient Medications on  File Prior to Visit   Medication Sig Dispense Refill    amLODIPine (NORVASC) 5 MG tablet Take 1 tablet by mouth Daily.      aspirin 81 MG EC tablet Take 1 tablet by mouth Daily.      atorvastatin (LIPITOR) 20 MG tablet Take 1 tablet by mouth Daily.      cilostazol (PLETAL) 50 MG tablet Take 1 tablet by mouth.      fluticasone (Flonase Allergy Relief) 50 MCG/ACT nasal spray Administer 2 sprays into the nostril(s) as directed by provider Daily.      gabapentin (NEURONTIN) 800 MG tablet Take 1 tablet in morning, take 1 tablet mid day, and 2 tablets at bedtime.      icosapent ethyl (VASCEPA) 1 g capsule capsule Take 2 g by mouth 2 (Two) Times a Day With Meals.      lisinopril (PRINIVIL,ZESTRIL) 10 MG tablet Take 1 tablet by mouth Daily.      metoprolol tartrate (LOPRESSOR) 25 MG tablet Take 1 tablet by mouth 2 (Two) Times a Day.      omeprazole (priLOSEC) 20 MG capsule Take 1 capsule by mouth Daily.      OneTouch Verio test strip CHECK BLOOD SUGAR THREE TIMES A DAY      promethazine (PHENERGAN) 25 MG tablet Take 1 tablet by mouth As Needed.      vitamin B-12 (CYANOCOBALAMIN) 1000 MCG tablet Take 1 tablet by mouth Daily.      [DISCONTINUED] Continuous Glucose Sensor (Dexcom G7 Sensor) misc Use 1 each Every 10 (Ten) Days. 3 each 5    [DISCONTINUED] Continuous Glucose Sensor (Dexcom G7 Sensor) misc Use.      [DISCONTINUED] Insulin Glargine (LANTUS SOLOSTAR) 100 UNIT/ML injection pen Inject 40 Units under the skin into the appropriate area as directed Every Night. 15 mL 2    [DISCONTINUED] Insulin Lispro, 1 Unit Dial, (HUMALOG) 100 UNIT/ML solution pen-injector Inject 50 Units under the skin into the appropriate area as directed 3 (Three) Times a Day Before Meals. 45 mL 2    [DISCONTINUED] Insulin Pen Needle (BD Pen Needle Micro U/F) 32G X 6 MM misc USE FOUR TIMES DAILY 100 each 2     No current facility-administered medications on file prior to visit.        Review of Systems   Constitutional:  Positive for fatigue and  "unexpected weight loss.   Eyes:  Positive for blurred vision.   Endocrine: Positive for polydipsia, polyphagia and polyuria.   Psychiatric/Behavioral:  Positive for sleep disturbance.    All other systems reviewed and are negative.    BP (!) 188/113 (BP Location: Right arm, Patient Position: Sitting, Cuff Size: Adult)   Pulse 100   Wt 97.6 kg (215 lb 3.2 oz)   SpO2 98%   BMI 31.78 kg/m²      Physical Exam  Vitals reviewed.   Constitutional:       Appearance: Normal appearance.   Eyes:      Extraocular Movements: Extraocular movements intact.   Cardiovascular:      Rate and Rhythm: Tachycardia present.   Pulmonary:      Effort: Pulmonary effort is normal.   Skin:     General: Skin is warm.   Neurological:      General: No focal deficit present.      Mental Status: He is alert and oriented to person, place, and time.   Psychiatric:         Mood and Affect: Mood normal.         Behavior: Behavior normal.         Thought Content: Thought content normal.         Judgment: Judgment normal.       CMP:  Lab Results   Component Value Date    Glucose 310 (H) 03/24/2025    Glucose 444 (A) 07/16/2024    Glucose, Fasting 263 (H) 04/10/2025    Glucose,  mg/dL (2+) (A) 03/24/2025    BUN 16 03/24/2025    BUN/Creatinine Ratio 10.3 03/24/2025    Creatinine 1.55 (H) 03/24/2025    Creatinine, Urine 44.3 03/24/2025    Ketones, UA Negative 03/24/2025    CO2 28.1 03/24/2025    Calcium 9.2 03/24/2025    Albumin 3.0 (L) 03/24/2025    AST (SGOT) 15 03/24/2025    ALT (SGPT) 10 03/24/2025     Lipid Panel:  No results found for: \"CHOL\", \"TRIG\", \"HDL\", \"VLDL\", \"LDL\"  HbA1c:  Lab Results   Component Value Date    HGBA1C 10.5 (A) 07/16/2024       Glucose:  Lab Results   Component Value Date    POCGLU 444 (A) 07/16/2024     Microalbumin:  Lab Results   Component Value Date    MALBCRERATIO 2,439.0 09/12/2023     TSH:  No results found for: \"TSH\"    Assessment and Plan    Diagnoses and all orders for this visit:    1. Type 2 diabetes " mellitus with hyperglycemia, with long-term current use of insulin (Primary)  Assessment & Plan:  -Diabetes is above goal with A1c 10.5.  -Discussed dietary and exercise guidelines with patient.  -Discussed the importance of yearly eye exams.  -Discussed the importance of checking BG's regularly.  Continue using CGM.  2 week data download is as follows:  Very High: 77%  High:15%  In Range:8%  Low: 0%  Very Low:0%  Trend shows overall hypers with varying hypers and hypos as well.  -Continue Humalog 50 units TID.  -Continue Lantus 50 units QHS.  -Discussed insulin pumps in depth.  Will obtain labs today to determine if patient is able to get a beta insulin pump.  IF his labs will allow, will start on this.  If labs will not allow for Beta pump, will start on Omnipod.  -Discussed use of Glucagon and appropriate time of use.   -Discussed taking medication regularly without missing doses.  -S/S hypoglycemia reviewed with Rule of 15's advised.  -Follow-up in 3 months.     Orders:  -     C-Peptide  -     Glucose, Fasting  -     ZNT8 Antibodies  -     Insulin Antibody  -     IA-2 Autoantibodies    Other orders  -     Insulin Glargine (LANTUS SOLOSTAR) 100 UNIT/ML injection pen; Inject 50 Units under the skin into the appropriate area as directed Every Night.  Dispense: 45 mL; Refill: 1  -     Insulin Lispro, 1 Unit Dial, (HUMALOG) 100 UNIT/ML solution pen-injector; Inject 50 Units under the skin into the appropriate area as directed 3 (Three) Times a Day Before Meals.  Dispense: 45 mL; Refill: 2  -     Insulin Pen Needle (BD Pen Needle Micro U/F) 32G X 6 MM misc; 1 each by Subconjunctival route 4 (Four) Times a Day.  Dispense: 200 each; Refill: 2  -     Continuous Glucose Sensor (Dexcom G7 Sensor) misc; Use 1 each Every 10 (Ten) Days.  Dispense: 9 each; Refill: 1  -     Glucagon (Baqsimi Two Pack) 3 MG/DOSE powder; Administer 3 mg into the nostril(s) as directed by provider As Needed (for severe hypoglycemia).  Dispense: 1  each; Refill: 2           Return in about 3 months (around 7/10/2025) for Follow-up appointment, A1C. The patient was instructed to contact the clinic with any interval questions or concerns.        This document has been electronically signed by STU Shaikh  April 10, 2025 13:42 EDT   Endocrinology    Please note that portions of this document were completed with a voice recognition program. Efforts were made to edit the dictations, but occasionally words are mis-transcribed.

## 2025-04-11 LAB — C PEPTIDE SERPL-MCNC: 6.8 NG/ML (ref 1.1–4.4)

## 2025-04-16 LAB — ISLET CELL512 AB SER-ACNC: <7.5 U/ML

## 2025-04-17 LAB — INSULIN AB SER-ACNC: <5 UU/ML

## 2025-04-19 LAB — ZNT8 AB SERPL IA-ACNC: <15 U/ML

## 2025-04-21 RX ORDER — GLUCAGON 3 MG/1
3 POWDER NASAL AS NEEDED
Qty: 1 EACH | Refills: 2 | Status: SHIPPED | OUTPATIENT
Start: 2025-04-21

## 2025-04-21 NOTE — TELEPHONE ENCOUNTER
DELETE AFTER REVIEWING: Telephone encounter to be sent to the non-clinical pool.    The [facility] received a fax that requires your attention. The document has been indexed to the patient’s chart for your review.      Reason for sending: REFILL REQUEST    Documents Description: REFILL REQUEST (BAQSIMI)-[id number]    Name of Sender: SHAD    Date Indexed: 4.21.25

## 2025-04-22 ENCOUNTER — HOSPITAL ENCOUNTER (OUTPATIENT)
Dept: ULTRASOUND IMAGING | Facility: HOSPITAL | Age: 60
Discharge: HOME | End: 2025-04-22
Admitting: INTERNAL MEDICINE
Payer: COMMERCIAL

## 2025-04-22 DIAGNOSIS — N28.1 SIMPLE RENAL CYST: ICD-10-CM

## 2025-04-22 PROCEDURE — 76775 US EXAM ABDO BACK WALL LIM: CPT | Performed by: RADIOLOGY

## 2025-04-22 PROCEDURE — 76775 US EXAM ABDO BACK WALL LIM: CPT

## 2025-05-20 ENCOUNTER — LAB (OUTPATIENT)
Dept: LAB | Facility: HOSPITAL | Age: 60
End: 2025-05-20
Payer: COMMERCIAL

## 2025-05-20 ENCOUNTER — TRANSCRIBE ORDERS (OUTPATIENT)
Dept: ADMINISTRATIVE | Facility: HOSPITAL | Age: 60
End: 2025-05-20
Payer: COMMERCIAL

## 2025-05-20 DIAGNOSIS — N18.31 CHRONIC KIDNEY DISEASE, STAGE 3A: Primary | ICD-10-CM

## 2025-05-20 DIAGNOSIS — N18.31 CHRONIC KIDNEY DISEASE, STAGE 3A: ICD-10-CM

## 2025-05-20 PROCEDURE — 84156 ASSAY OF PROTEIN URINE: CPT

## 2025-05-20 PROCEDURE — 86162 COMPLEMENT TOTAL (CH50): CPT

## 2025-05-20 PROCEDURE — 83516 IMMUNOASSAY NONANTIBODY: CPT

## 2025-05-20 PROCEDURE — 82570 ASSAY OF URINE CREATININE: CPT

## 2025-05-20 PROCEDURE — 82043 UR ALBUMIN QUANTITATIVE: CPT

## 2025-05-20 PROCEDURE — 36415 COLL VENOUS BLD VENIPUNCTURE: CPT

## 2025-05-20 PROCEDURE — 86037 ANCA TITER EACH ANTIBODY: CPT

## 2025-05-20 PROCEDURE — 82784 ASSAY IGA/IGD/IGG/IGM EACH: CPT

## 2025-05-20 PROCEDURE — 86038 ANTINUCLEAR ANTIBODIES: CPT

## 2025-05-20 PROCEDURE — 84165 PROTEIN E-PHORESIS SERUM: CPT

## 2025-05-20 PROCEDURE — 83521 IG LIGHT CHAINS FREE EACH: CPT

## 2025-05-20 PROCEDURE — 86706 HEP B SURFACE ANTIBODY: CPT

## 2025-05-20 PROCEDURE — 80053 COMPREHEN METABOLIC PANEL: CPT

## 2025-05-20 PROCEDURE — 86160 COMPLEMENT ANTIGEN: CPT

## 2025-05-20 PROCEDURE — 81001 URINALYSIS AUTO W/SCOPE: CPT

## 2025-05-20 PROCEDURE — 86803 HEPATITIS C AB TEST: CPT

## 2025-05-20 PROCEDURE — 86334 IMMUNOFIX E-PHORESIS SERUM: CPT

## 2025-05-21 LAB
ALBUMIN SERPL-MCNC: 3.1 G/DL (ref 3.5–5.2)
ALBUMIN UR-MCNC: >440 MG/DL
ALBUMIN/GLOB SERPL: 0.9 G/DL
ALP SERPL-CCNC: 67 U/L (ref 39–117)
ALT SERPL W P-5'-P-CCNC: 14 U/L (ref 1–41)
ANION GAP SERPL CALCULATED.3IONS-SCNC: 11.9 MMOL/L (ref 5–15)
AST SERPL-CCNC: 20 U/L (ref 1–40)
BACTERIA UR QL AUTO: ABNORMAL /HPF
BILIRUB SERPL-MCNC: 0.4 MG/DL (ref 0–1.2)
BILIRUB UR QL STRIP: NEGATIVE
BUN SERPL-MCNC: 17 MG/DL (ref 8–23)
BUN/CREAT SERPL: 9.5 (ref 7–25)
C3 SERPL-MCNC: 170 MG/DL (ref 82–167)
C4 SERPL-MCNC: 35 MG/DL (ref 14–44)
CALCIUM SPEC-SCNC: 10.6 MG/DL (ref 8.6–10.5)
CHLORIDE SERPL-SCNC: 100 MMOL/L (ref 98–107)
CLARITY UR: CLEAR
CO2 SERPL-SCNC: 24.1 MMOL/L (ref 22–29)
COD CRY URNS QL: PRESENT /HPF
COLOR UR: YELLOW
CREAT SERPL-MCNC: 1.79 MG/DL (ref 0.76–1.27)
CREAT UR-MCNC: 86.1 MG/DL
CREAT UR-MCNC: 86.1 MG/DL
EGFRCR SERPLBLD CKD-EPI 2021: 42.3 ML/MIN/1.73
GLOBULIN UR ELPH-MCNC: 3.3 GM/DL
GLUCOSE SERPL-MCNC: 276 MG/DL (ref 65–99)
GLUCOSE UR STRIP-MCNC: ABNORMAL MG/DL
HBV SURFACE AB SER RIA-ACNC: NORMAL
HCV AB SER QL: NORMAL
HGB UR QL STRIP.AUTO: ABNORMAL
HOLD SPECIMEN: NORMAL
HYALINE CASTS UR QL AUTO: ABNORMAL /LPF
KETONES UR QL STRIP: NEGATIVE
LEUKOCYTE ESTERASE UR QL STRIP.AUTO: NEGATIVE
MICROALBUMIN/CREAT UR: NORMAL MG/G{CREAT}
NITRITE UR QL STRIP: NEGATIVE
PH UR STRIP.AUTO: 6 [PH] (ref 5–8)
POTASSIUM SERPL-SCNC: 4.5 MMOL/L (ref 3.5–5.2)
PROT ?TM UR-MCNC: 867 MG/DL
PROT SERPL-MCNC: 6.4 G/DL (ref 6–8.5)
PROT UR QL STRIP: ABNORMAL
PROT/CREAT UR: ABNORMAL MG/G CREA (ref 0–200)
RBC # UR STRIP: ABNORMAL /HPF
REF LAB TEST METHOD: ABNORMAL
SODIUM SERPL-SCNC: 136 MMOL/L (ref 136–145)
SP GR UR STRIP: 1.02 (ref 1–1.03)
SQUAMOUS #/AREA URNS HPF: ABNORMAL /HPF
UROBILINOGEN UR QL STRIP: ABNORMAL
WBC # UR STRIP: ABNORMAL /HPF

## 2025-05-22 ENCOUNTER — LAB (OUTPATIENT)
Dept: LAB | Facility: HOSPITAL | Age: 60
End: 2025-05-22
Payer: COMMERCIAL

## 2025-05-22 DIAGNOSIS — N18.31 CHRONIC KIDNEY DISEASE, STAGE 3A: ICD-10-CM

## 2025-05-22 LAB
ANA SER QL: NEGATIVE
CH50 SERPL-ACNC: >60 U/ML

## 2025-05-22 PROCEDURE — 81050 URINALYSIS VOLUME MEASURE: CPT

## 2025-05-22 PROCEDURE — 84156 ASSAY OF PROTEIN URINE: CPT

## 2025-05-23 LAB
ALBUMIN SERPL ELPH-MCNC: 2.6 G/DL (ref 2.9–4.4)
ALBUMIN/GLOB SERPL: 0.8 {RATIO} (ref 0.7–1.7)
ALPHA1 GLOB SERPL ELPH-MCNC: 0.2 G/DL (ref 0–0.4)
ALPHA2 GLOB SERPL ELPH-MCNC: 1.2 G/DL (ref 0.4–1)
B-GLOBULIN SERPL ELPH-MCNC: 1.1 G/DL (ref 0.7–1.3)
COLLECT DURATION TIME UR: 24 HRS
GAMMA GLOB SERPL ELPH-MCNC: 1 G/DL (ref 0.4–1.8)
GBM AB SER IA-ACNC: <0.2 UNITS (ref 0–0.9)
GLOBULIN SER-MCNC: 3.4 G/DL (ref 2.2–3.9)
IGA SERPL-MCNC: 230 MG/DL (ref 61–437)
IGG SERPL-MCNC: 804 MG/DL (ref 603–1613)
IGM SERPL-MCNC: 124 MG/DL (ref 20–172)
INTERPRETATION SERPL IEP-IMP: ABNORMAL
KAPPA LC FREE SER-MCNC: 67.2 MG/L (ref 3.3–19.4)
KAPPA LC FREE/LAMBDA FREE SER: 1.79 {RATIO} (ref 0.26–1.65)
LABORATORY COMMENT REPORT: ABNORMAL
LAMBDA LC FREE SERPL-MCNC: 37.6 MG/L (ref 5.7–26.3)
M PROTEIN SERPL ELPH-MCNC: ABNORMAL G/DL
PROT 24H UR-MRATE: 5142 MG/24HOURS (ref 0–150)
PROT SERPL-MCNC: 6 G/DL (ref 6–8.5)
SPECIMEN VOL 24H UR: 1500 ML

## 2025-05-26 LAB
C-ANCA TITR SER IF: NORMAL TITER
MYELOPEROXIDASE AB SER IA-ACNC: <0.2 UNITS (ref 0–0.9)
P-ANCA ATYPICAL TITR SER IF: NORMAL TITER
P-ANCA TITR SER IF: NORMAL TITER
PROTEINASE3 AB SER IA-ACNC: <0.2 UNITS (ref 0–0.9)

## 2025-06-25 ENCOUNTER — TRANSCRIBE ORDERS (OUTPATIENT)
Dept: ADMINISTRATIVE | Facility: HOSPITAL | Age: 60
End: 2025-06-25
Payer: COMMERCIAL

## 2025-06-25 ENCOUNTER — LAB (OUTPATIENT)
Dept: LAB | Facility: HOSPITAL | Age: 60
End: 2025-06-25
Payer: COMMERCIAL

## 2025-06-25 DIAGNOSIS — N18.31 CHRONIC KIDNEY DISEASE, STAGE 3A: ICD-10-CM

## 2025-06-25 DIAGNOSIS — N18.31 CHRONIC KIDNEY DISEASE, STAGE 3A: Primary | ICD-10-CM

## 2025-06-25 PROCEDURE — 86334 IMMUNOFIX E-PHORESIS SERUM: CPT

## 2025-06-25 PROCEDURE — 80053 COMPREHEN METABOLIC PANEL: CPT

## 2025-06-25 PROCEDURE — 82784 ASSAY IGA/IGD/IGG/IGM EACH: CPT

## 2025-06-25 PROCEDURE — 36415 COLL VENOUS BLD VENIPUNCTURE: CPT

## 2025-06-25 PROCEDURE — 84165 PROTEIN E-PHORESIS SERUM: CPT

## 2025-06-25 PROCEDURE — 81001 URINALYSIS AUTO W/SCOPE: CPT

## 2025-06-26 ENCOUNTER — LAB (OUTPATIENT)
Dept: LAB | Facility: HOSPITAL | Age: 60
End: 2025-06-26
Payer: COMMERCIAL

## 2025-06-26 DIAGNOSIS — N18.31 CHRONIC KIDNEY DISEASE, STAGE 3A: ICD-10-CM

## 2025-06-26 LAB
ALBUMIN SERPL-MCNC: 2.9 G/DL (ref 3.5–5.2)
ALBUMIN/GLOB SERPL: 0.9 G/DL
ALP SERPL-CCNC: 67 U/L (ref 39–117)
ALT SERPL W P-5'-P-CCNC: 9 U/L (ref 1–41)
ANION GAP SERPL CALCULATED.3IONS-SCNC: 11.1 MMOL/L (ref 5–15)
AST SERPL-CCNC: 17 U/L (ref 1–40)
BACTERIA UR QL AUTO: ABNORMAL /HPF
BILIRUB SERPL-MCNC: 0.2 MG/DL (ref 0–1.2)
BILIRUB UR QL STRIP: NEGATIVE
BUN SERPL-MCNC: 19 MG/DL (ref 8–23)
BUN/CREAT SERPL: 9.7 (ref 7–25)
CALCIUM SPEC-SCNC: 9.1 MG/DL (ref 8.6–10.5)
CHLORIDE SERPL-SCNC: 101 MMOL/L (ref 98–107)
CLARITY UR: CLEAR
CO2 SERPL-SCNC: 25.9 MMOL/L (ref 22–29)
COLOR UR: YELLOW
CREAT SERPL-MCNC: 1.96 MG/DL (ref 0.76–1.27)
EGFRCR SERPLBLD CKD-EPI 2021: 37.9 ML/MIN/1.73
GLOBULIN UR ELPH-MCNC: 3.3 GM/DL
GLUCOSE SERPL-MCNC: 172 MG/DL (ref 65–99)
GLUCOSE UR STRIP-MCNC: ABNORMAL MG/DL
HGB UR QL STRIP.AUTO: ABNORMAL
HOLD SPECIMEN: NORMAL
HYALINE CASTS UR QL AUTO: ABNORMAL /LPF
KETONES UR QL STRIP: NEGATIVE
LEUKOCYTE ESTERASE UR QL STRIP.AUTO: NEGATIVE
NITRITE UR QL STRIP: NEGATIVE
PH UR STRIP.AUTO: 6.5 [PH] (ref 5–8)
POTASSIUM SERPL-SCNC: 4.4 MMOL/L (ref 3.5–5.2)
PROT SERPL-MCNC: 6.2 G/DL (ref 6–8.5)
PROT UR QL STRIP: ABNORMAL
RBC # UR STRIP: ABNORMAL /HPF
REF LAB TEST METHOD: ABNORMAL
SODIUM SERPL-SCNC: 138 MMOL/L (ref 136–145)
SP GR UR STRIP: 1.02 (ref 1–1.03)
SQUAMOUS #/AREA URNS HPF: ABNORMAL /HPF
UROBILINOGEN UR QL STRIP: ABNORMAL
WBC # UR STRIP: ABNORMAL /HPF

## 2025-06-26 PROCEDURE — 84156 ASSAY OF PROTEIN URINE: CPT

## 2025-06-26 PROCEDURE — 81050 URINALYSIS VOLUME MEASURE: CPT

## 2025-06-27 LAB
ALBUMIN SERPL ELPH-MCNC: 2.5 G/DL (ref 2.9–4.4)
ALBUMIN/GLOB SERPL: 0.8 {RATIO} (ref 0.7–1.7)
ALPHA1 GLOB SERPL ELPH-MCNC: 0.2 G/DL (ref 0–0.4)
ALPHA2 GLOB SERPL ELPH-MCNC: 1.1 G/DL (ref 0.4–1)
B-GLOBULIN SERPL ELPH-MCNC: 1 G/DL (ref 0.7–1.3)
COLLECT DURATION TIME UR: 24 HRS
GAMMA GLOB SERPL ELPH-MCNC: 0.8 G/DL (ref 0.4–1.8)
GLOBULIN SER-MCNC: 3.2 G/DL (ref 2.2–3.9)
IGA SERPL-MCNC: 237 MG/DL (ref 61–437)
IGG SERPL-MCNC: 934 MG/DL (ref 603–1613)
IGM SERPL-MCNC: 131 MG/DL (ref 20–172)
INTERPRETATION SERPL IEP-IMP: ABNORMAL
LABORATORY COMMENT REPORT: ABNORMAL
M PROTEIN SERPL ELPH-MCNC: ABNORMAL G/DL
PROT 24H UR-MRATE: 6617.1 MG/24HOURS (ref 0–150)
PROT SERPL-MCNC: 5.7 G/DL (ref 6–8.5)
SPECIMEN VOL 24H UR: 2650 ML

## 2025-07-29 ENCOUNTER — TRANSCRIBE ORDERS (OUTPATIENT)
Dept: ADMINISTRATIVE | Facility: HOSPITAL | Age: 60
End: 2025-07-29
Payer: COMMERCIAL

## 2025-07-29 ENCOUNTER — LAB (OUTPATIENT)
Dept: LAB | Facility: HOSPITAL | Age: 60
End: 2025-07-29
Payer: COMMERCIAL

## 2025-07-29 DIAGNOSIS — E11.42 DIABETIC SENSORIMOTOR NEUROPATHY: Primary | ICD-10-CM

## 2025-07-29 DIAGNOSIS — N18.31 STAGE 3A CHRONIC KIDNEY DISEASE: ICD-10-CM

## 2025-07-29 DIAGNOSIS — E11.69 DIABETES MELLITUS ASSOCIATED WITH HORMONAL ETIOLOGY: ICD-10-CM

## 2025-07-29 DIAGNOSIS — E11.42 DIABETIC SENSORIMOTOR NEUROPATHY: ICD-10-CM

## 2025-07-29 DIAGNOSIS — N18.31 STAGE 3A CHRONIC KIDNEY DISEASE: Primary | ICD-10-CM

## 2025-07-29 PROCEDURE — 81001 URINALYSIS AUTO W/SCOPE: CPT

## 2025-07-29 PROCEDURE — 84156 ASSAY OF PROTEIN URINE: CPT

## 2025-07-29 PROCEDURE — 83036 HEMOGLOBIN GLYCOSYLATED A1C: CPT

## 2025-07-29 PROCEDURE — 80053 COMPREHEN METABOLIC PANEL: CPT

## 2025-07-29 PROCEDURE — 36415 COLL VENOUS BLD VENIPUNCTURE: CPT

## 2025-07-29 PROCEDURE — 82570 ASSAY OF URINE CREATININE: CPT

## 2025-07-30 LAB
ALBUMIN SERPL-MCNC: 2.9 G/DL (ref 3.5–5.2)
ALBUMIN/GLOB SERPL: 0.9 G/DL
ALP SERPL-CCNC: 66 U/L (ref 39–117)
ALT SERPL W P-5'-P-CCNC: 12 U/L (ref 1–41)
ANION GAP SERPL CALCULATED.3IONS-SCNC: 10.4 MMOL/L (ref 5–15)
AST SERPL-CCNC: 19 U/L (ref 1–40)
BACTERIA UR QL AUTO: ABNORMAL /HPF
BILIRUB SERPL-MCNC: 0.3 MG/DL (ref 0–1.2)
BILIRUB UR QL STRIP: NEGATIVE
BUN SERPL-MCNC: 18 MG/DL (ref 8–23)
BUN/CREAT SERPL: 9.7 (ref 7–25)
CALCIUM SPEC-SCNC: 8.9 MG/DL (ref 8.6–10.5)
CHLORIDE SERPL-SCNC: 104 MMOL/L (ref 98–107)
CLARITY UR: CLEAR
CO2 SERPL-SCNC: 25.6 MMOL/L (ref 22–29)
COLOR UR: YELLOW
CREAT SERPL-MCNC: 1.85 MG/DL (ref 0.76–1.27)
CREAT UR-MCNC: 104.6 MG/DL
EGFRCR SERPLBLD CKD-EPI 2021: 40.7 ML/MIN/1.73
GLOBULIN UR ELPH-MCNC: 3.3 GM/DL
GLUCOSE SERPL-MCNC: 141 MG/DL (ref 65–99)
GLUCOSE UR STRIP-MCNC: ABNORMAL MG/DL
GRAN CASTS URNS QL MICRO: PRESENT /LPF
HBA1C MFR BLD: 8.4 % (ref 4.8–5.6)
HGB UR QL STRIP.AUTO: ABNORMAL
HOLD SPECIMEN: NORMAL
HYALINE CASTS UR QL AUTO: ABNORMAL /LPF
KETONES UR QL STRIP: ABNORMAL
LEUKOCYTE ESTERASE UR QL STRIP.AUTO: NEGATIVE
NITRITE UR QL STRIP: NEGATIVE
PH UR STRIP.AUTO: 6 [PH] (ref 5–8)
POTASSIUM SERPL-SCNC: 4.6 MMOL/L (ref 3.5–5.2)
PROT ?TM UR-MCNC: 848 MG/DL
PROT SERPL-MCNC: 6.2 G/DL (ref 6–8.5)
PROT UR QL STRIP: ABNORMAL
PROT/CREAT UR: 8107.1 MG/G CREA (ref 0–200)
RBC # UR STRIP: ABNORMAL /HPF
REF LAB TEST METHOD: ABNORMAL
SODIUM SERPL-SCNC: 140 MMOL/L (ref 136–145)
SP GR UR STRIP: 1.02 (ref 1–1.03)
SQUAMOUS #/AREA URNS HPF: ABNORMAL /HPF
UROBILINOGEN UR QL STRIP: ABNORMAL
WBC # UR STRIP: ABNORMAL /HPF

## 2025-08-21 ENCOUNTER — SPECIALTY PHARMACY (OUTPATIENT)
Dept: ENDOCRINOLOGY | Facility: CLINIC | Age: 60
End: 2025-08-21
Payer: COMMERCIAL

## 2025-08-21 ENCOUNTER — OFFICE VISIT (OUTPATIENT)
Dept: ENDOCRINOLOGY | Facility: CLINIC | Age: 60
End: 2025-08-21
Payer: COMMERCIAL

## 2025-08-21 VITALS — DIASTOLIC BLOOD PRESSURE: 90 MMHG | BODY MASS INDEX: 31.43 KG/M2 | WEIGHT: 212.8 LBS | SYSTOLIC BLOOD PRESSURE: 176 MMHG

## 2025-08-21 DIAGNOSIS — E11.65 TYPE 2 DIABETES MELLITUS WITH HYPERGLYCEMIA, WITH LONG-TERM CURRENT USE OF INSULIN: Primary | ICD-10-CM

## 2025-08-21 DIAGNOSIS — Z79.4 TYPE 2 DIABETES MELLITUS WITH HYPERGLYCEMIA, WITH LONG-TERM CURRENT USE OF INSULIN: Primary | ICD-10-CM

## 2025-08-21 RX ORDER — GLUCAGON 1 MG
1 KIT INJECTION AS NEEDED
Qty: 1 EACH | Refills: 2 | Status: SHIPPED | OUTPATIENT
Start: 2025-08-21

## 2025-08-21 RX ORDER — INSULIN LISPRO 100 [IU]/ML
INJECTION, SOLUTION INTRAVENOUS; SUBCUTANEOUS
Qty: 30 ML | Refills: 5 | Status: SHIPPED | OUTPATIENT
Start: 2025-08-21

## 2025-08-21 RX ORDER — CARVEDILOL 25 MG/1
25 TABLET ORAL
COMMUNITY
Start: 2025-08-11

## 2025-08-21 RX ORDER — INSULIN PMP CART,AUT,G6/7,CNTR
1 EACH SUBCUTANEOUS EVERY OTHER DAY
Qty: 15 EACH | Refills: 5 | Status: SHIPPED | OUTPATIENT
Start: 2025-08-21

## 2025-08-21 RX ORDER — ACYCLOVIR 400 MG/1
1 TABLET ORAL
Qty: 9 EACH | Refills: 1 | Status: SHIPPED | OUTPATIENT
Start: 2025-08-21

## 2025-08-21 RX ORDER — INSULIN PMP CART,AUT,G6/7,CNTR
1 EACH SUBCUTANEOUS TAKE AS DIRECTED
Qty: 1 KIT | Refills: 0 | Status: SHIPPED | OUTPATIENT
Start: 2025-08-21

## 2025-08-21 RX ORDER — HYDRALAZINE HYDROCHLORIDE 50 MG/1
50 TABLET, FILM COATED ORAL 3 TIMES DAILY
COMMUNITY
Start: 2025-08-07

## 2025-08-21 RX ORDER — GLUCAGON 3 MG/1
3 POWDER NASAL AS NEEDED
Qty: 2 EACH | Refills: 2 | Status: SHIPPED | OUTPATIENT
Start: 2025-08-21 | End: 2025-08-21